# Patient Record
Sex: MALE | Race: WHITE | NOT HISPANIC OR LATINO | Employment: FULL TIME | URBAN - METROPOLITAN AREA
[De-identification: names, ages, dates, MRNs, and addresses within clinical notes are randomized per-mention and may not be internally consistent; named-entity substitution may affect disease eponyms.]

---

## 2023-05-25 DIAGNOSIS — Z13.21 ENCOUNTER FOR VITAMIN DEFICIENCY SCREENING: ICD-10-CM

## 2023-05-25 DIAGNOSIS — Z13.1 SCREENING FOR DIABETES MELLITUS: ICD-10-CM

## 2023-05-25 DIAGNOSIS — Z00.00 HEALTHCARE MAINTENANCE: Primary | ICD-10-CM

## 2023-05-25 DIAGNOSIS — Z13.29 SCREENING FOR THYROID DISORDER: ICD-10-CM

## 2023-06-01 ENCOUNTER — LAB (OUTPATIENT)
Dept: LAB | Facility: LAB | Age: 48
End: 2023-06-01
Payer: COMMERCIAL

## 2023-06-01 DIAGNOSIS — Z13.21 ENCOUNTER FOR VITAMIN DEFICIENCY SCREENING: ICD-10-CM

## 2023-06-01 DIAGNOSIS — Z00.00 HEALTHCARE MAINTENANCE: ICD-10-CM

## 2023-06-01 DIAGNOSIS — Z13.29 SCREENING FOR THYROID DISORDER: ICD-10-CM

## 2023-06-01 DIAGNOSIS — Z13.1 SCREENING FOR DIABETES MELLITUS: ICD-10-CM

## 2023-06-01 LAB
ALANINE AMINOTRANSFERASE (SGPT) (U/L) IN SER/PLAS: 18 U/L (ref 10–52)
ALBUMIN (G/DL) IN SER/PLAS: 4.3 G/DL (ref 3.4–5)
ALKALINE PHOSPHATASE (U/L) IN SER/PLAS: 34 U/L (ref 33–120)
ANION GAP IN SER/PLAS: 12 MMOL/L (ref 10–20)
ASPARTATE AMINOTRANSFERASE (SGOT) (U/L) IN SER/PLAS: 15 U/L (ref 9–39)
BASOPHILS (10*3/UL) IN BLOOD BY AUTOMATED COUNT: 0.03 X10E9/L (ref 0–0.1)
BASOPHILS/100 LEUKOCYTES IN BLOOD BY AUTOMATED COUNT: 0.6 % (ref 0–2)
BILIRUBIN TOTAL (MG/DL) IN SER/PLAS: 0.8 MG/DL (ref 0–1.2)
CALCIDIOL (25 OH VITAMIN D3) (NG/ML) IN SER/PLAS: 45 NG/ML
CALCIUM (MG/DL) IN SER/PLAS: 9.3 MG/DL (ref 8.6–10.3)
CARBON DIOXIDE, TOTAL (MMOL/L) IN SER/PLAS: 24 MMOL/L (ref 21–32)
CHLORIDE (MMOL/L) IN SER/PLAS: 105 MMOL/L (ref 98–107)
CHOLESTEROL (MG/DL) IN SER/PLAS: 165 MG/DL (ref 0–199)
CHOLESTEROL IN HDL (MG/DL) IN SER/PLAS: 31.9 MG/DL
CHOLESTEROL/HDL RATIO: 5.2
CREATININE (MG/DL) IN SER/PLAS: 1.01 MG/DL (ref 0.5–1.3)
EOSINOPHILS (10*3/UL) IN BLOOD BY AUTOMATED COUNT: 0.06 X10E9/L (ref 0–0.7)
EOSINOPHILS/100 LEUKOCYTES IN BLOOD BY AUTOMATED COUNT: 1.1 % (ref 0–6)
ERYTHROCYTE DISTRIBUTION WIDTH (RATIO) BY AUTOMATED COUNT: 14.6 % (ref 11.5–14.5)
ERYTHROCYTE MEAN CORPUSCULAR HEMOGLOBIN CONCENTRATION (G/DL) BY AUTOMATED: 32.9 G/DL (ref 32–36)
ERYTHROCYTE MEAN CORPUSCULAR VOLUME (FL) BY AUTOMATED COUNT: 86 FL (ref 80–100)
ERYTHROCYTES (10*6/UL) IN BLOOD BY AUTOMATED COUNT: 5.45 X10E12/L (ref 4.5–5.9)
ESTIMATED AVERAGE GLUCOSE FOR HBA1C: 123 MG/DL
GFR MALE: >90 ML/MIN/1.73M2
GLUCOSE (MG/DL) IN SER/PLAS: 91 MG/DL (ref 74–99)
HEMATOCRIT (%) IN BLOOD BY AUTOMATED COUNT: 46.8 % (ref 41–52)
HEMOGLOBIN (G/DL) IN BLOOD: 15.4 G/DL (ref 13.5–17.5)
HEMOGLOBIN A1C/HEMOGLOBIN TOTAL IN BLOOD: 5.9 %
IMMATURE GRANULOCYTES/100 LEUKOCYTES IN BLOOD BY AUTOMATED COUNT: 0.2 % (ref 0–0.9)
LDL: 116 MG/DL (ref 0–99)
LEUKOCYTES (10*3/UL) IN BLOOD BY AUTOMATED COUNT: 5.4 X10E9/L (ref 4.4–11.3)
LYMPHOCYTES (10*3/UL) IN BLOOD BY AUTOMATED COUNT: 1.73 X10E9/L (ref 1.2–4.8)
LYMPHOCYTES/100 LEUKOCYTES IN BLOOD BY AUTOMATED COUNT: 32.1 % (ref 13–44)
MONOCYTES (10*3/UL) IN BLOOD BY AUTOMATED COUNT: 0.55 X10E9/L (ref 0.1–1)
MONOCYTES/100 LEUKOCYTES IN BLOOD BY AUTOMATED COUNT: 10.2 % (ref 2–10)
NEUTROPHILS (10*3/UL) IN BLOOD BY AUTOMATED COUNT: 3.01 X10E9/L (ref 1.2–7.7)
NEUTROPHILS/100 LEUKOCYTES IN BLOOD BY AUTOMATED COUNT: 55.8 % (ref 40–80)
PLATELETS (10*3/UL) IN BLOOD AUTOMATED COUNT: 198 X10E9/L (ref 150–450)
POTASSIUM (MMOL/L) IN SER/PLAS: 4.3 MMOL/L (ref 3.5–5.3)
PROTEIN TOTAL: 6.7 G/DL (ref 6.4–8.2)
SODIUM (MMOL/L) IN SER/PLAS: 137 MMOL/L (ref 136–145)
THYROTROPIN (MIU/L) IN SER/PLAS BY DETECTION LIMIT <= 0.05 MIU/L: 1.74 MIU/L (ref 0.44–3.98)
TRIGLYCERIDE (MG/DL) IN SER/PLAS: 85 MG/DL (ref 0–149)
UREA NITROGEN (MG/DL) IN SER/PLAS: 15 MG/DL (ref 6–23)
VLDL: 17 MG/DL (ref 0–40)

## 2023-06-01 PROCEDURE — 85025 COMPLETE CBC W/AUTO DIFF WBC: CPT

## 2023-06-01 PROCEDURE — 36415 COLL VENOUS BLD VENIPUNCTURE: CPT

## 2023-06-01 PROCEDURE — 80061 LIPID PANEL: CPT

## 2023-06-01 PROCEDURE — 82306 VITAMIN D 25 HYDROXY: CPT

## 2023-06-01 PROCEDURE — 84443 ASSAY THYROID STIM HORMONE: CPT

## 2023-06-01 PROCEDURE — 83036 HEMOGLOBIN GLYCOSYLATED A1C: CPT

## 2023-06-01 PROCEDURE — 80053 COMPREHEN METABOLIC PANEL: CPT

## 2023-06-02 ENCOUNTER — TELEPHONE (OUTPATIENT)
Dept: PRIMARY CARE | Facility: CLINIC | Age: 48
End: 2023-06-02
Payer: COMMERCIAL

## 2023-06-02 NOTE — TELEPHONE ENCOUNTER
----- Message from KANDI Escudero sent at 6/2/2023  4:14 AM EDT -----  Pt was to have appointment with me for CPX  please call to schedule.

## 2023-06-08 ENCOUNTER — OFFICE VISIT (OUTPATIENT)
Dept: PRIMARY CARE | Facility: CLINIC | Age: 48
End: 2023-06-08
Payer: COMMERCIAL

## 2023-06-08 VITALS
HEIGHT: 71 IN | BODY MASS INDEX: 28 KG/M2 | WEIGHT: 200 LBS | SYSTOLIC BLOOD PRESSURE: 140 MMHG | TEMPERATURE: 98 F | DIASTOLIC BLOOD PRESSURE: 80 MMHG

## 2023-06-08 DIAGNOSIS — I25.10 CORONARY ARTERY DISEASE INVOLVING NATIVE HEART WITHOUT ANGINA PECTORIS, UNSPECIFIED VESSEL OR LESION TYPE: ICD-10-CM

## 2023-06-08 DIAGNOSIS — Z23 NEED FOR DIPHTHERIA-TETANUS-PERTUSSIS (TDAP) VACCINE: ICD-10-CM

## 2023-06-08 DIAGNOSIS — E78.6 LOW HDL (UNDER 40): ICD-10-CM

## 2023-06-08 DIAGNOSIS — I83.11 VARICOSE VEINS OF BOTH LOWER EXTREMITIES WITH INFLAMMATION: ICD-10-CM

## 2023-06-08 DIAGNOSIS — I83.11 VARICOSE VEINS OF RIGHT LOWER EXTREMITY WITH INFLAMMATION: ICD-10-CM

## 2023-06-08 DIAGNOSIS — I83.12 VARICOSE VEINS OF BOTH LOWER EXTREMITIES WITH INFLAMMATION: ICD-10-CM

## 2023-06-08 DIAGNOSIS — L73.9 FOLLICULITIS: ICD-10-CM

## 2023-06-08 DIAGNOSIS — E78.01 FAMILIAL HYPERCHOLESTEROLEMIA: ICD-10-CM

## 2023-06-08 DIAGNOSIS — Z00.00 ROUTINE GENERAL MEDICAL EXAMINATION AT A HEALTH CARE FACILITY: Primary | ICD-10-CM

## 2023-06-08 PROCEDURE — 1036F TOBACCO NON-USER: CPT | Performed by: NURSE PRACTITIONER

## 2023-06-08 PROCEDURE — 90715 TDAP VACCINE 7 YRS/> IM: CPT | Performed by: NURSE PRACTITIONER

## 2023-06-08 PROCEDURE — 90471 IMMUNIZATION ADMIN: CPT | Performed by: NURSE PRACTITIONER

## 2023-06-08 PROCEDURE — 99396 PREV VISIT EST AGE 40-64: CPT | Performed by: NURSE PRACTITIONER

## 2023-06-08 RX ORDER — FLUTICASONE PROPIONATE 50 MCG
SPRAY, SUSPENSION (ML) NASAL DAILY
COMMUNITY
Start: 2020-01-02

## 2023-06-08 RX ORDER — MAGNESIUM 250 MG
1 TABLET ORAL DAILY
COMMUNITY

## 2023-06-08 RX ORDER — MULTIVITAMIN
TABLET ORAL DAILY
COMMUNITY
Start: 2007-11-01

## 2023-06-08 RX ORDER — CHOLECALCIFEROL (VITAMIN D3) 125 MCG
1 CAPSULE ORAL DAILY
COMMUNITY
Start: 2017-05-19

## 2023-06-08 RX ORDER — DOXYCYCLINE 100 MG/1
100 CAPSULE ORAL 2 TIMES DAILY
Qty: 14 CAPSULE | Refills: 0 | Status: SHIPPED | OUTPATIENT
Start: 2023-06-08 | End: 2023-06-15

## 2023-06-08 RX ORDER — ACETAMINOPHEN 500 MG
1 TABLET ORAL NIGHTLY
COMMUNITY
Start: 2017-05-19 | End: 2023-12-08 | Stop reason: WASHOUT

## 2023-06-08 RX ORDER — ROSUVASTATIN CALCIUM 10 MG/1
10 TABLET, COATED ORAL DAILY
COMMUNITY
End: 2023-06-11 | Stop reason: SDUPTHER

## 2023-06-08 ASSESSMENT — PATIENT HEALTH QUESTIONNAIRE - PHQ9
2. FEELING DOWN, DEPRESSED OR HOPELESS: NOT AT ALL
1. LITTLE INTEREST OR PLEASURE IN DOING THINGS: NOT AT ALL
SUM OF ALL RESPONSES TO PHQ9 QUESTIONS 1 AND 2: 0

## 2023-06-08 NOTE — PATIENT INSTRUCTIONS
Good to see you today.  For the vein, call Vascular:  [phone]  For the CT Cardiac Score, Call [phone]  Start Doxycycline one pill twice daily TAKE AFTER EATING  Use Bacitracin twice daily.  Let me know if this is not improving.  Tetanus and pertussis - good for 10 years.  Plan to follow up with fasting labs in 6 months then follow up to review.

## 2023-06-08 NOTE — PROGRESS NOTES
"Subjective   Patient ID: Rodolfo Madera is a 48 y.o. male who presents for Annual Exam.    HPI   Left Repros in April and now working at   \"[employer]\"    Still working out regularly.    75 Hard work out every day and following strict plan and no sugar.    Put on some muscle pounds 5 #   Started with knee pain.    Running again.  Would like to get back to 183 #    Going to [place] with family in a couple weeks  Has had a cyst on scrotum.  No blistering or discharge  Saw Dr. Hernandez for issue but did not resolve.     Review of Systems   All other systems reviewed and are negative.      Objective   /80   Temp 36.7 °C (98 °F)   Ht 1.791 m (5' 10.5\")   Wt 90.7 kg (200 lb)   BMI 28.29 kg/m²     Physical Exam  Vitals and nursing note reviewed.   Constitutional:       Appearance: Normal appearance.   HENT:      Head: Normocephalic and atraumatic.      Right Ear: Tympanic membrane, ear canal and external ear normal.      Left Ear: Tympanic membrane, ear canal and external ear normal.      Nose: Nose normal.      Mouth/Throat:      Pharynx: Oropharynx is clear.   Eyes:      Pupils: Pupils are equal, round, and reactive to light.   Cardiovascular:      Rate and Rhythm: Normal rate and regular rhythm.      Pulses: Normal pulses.      Heart sounds: Normal heart sounds.   Pulmonary:      Breath sounds: Normal breath sounds.   Abdominal:      General: Bowel sounds are normal.      Palpations: Abdomen is soft.      Hernia: No hernia is present.   Genitourinary:     Testes: Normal.      Comments: Firm pustule at shaft of penis  Musculoskeletal:         General: Normal range of motion.      Cervical back: Normal range of motion and neck supple.   Skin:     General: Skin is warm.      Capillary Refill: Capillary refill takes 2 to 3 seconds.   Neurological:      Mental Status: He is alert and oriented to person, place, and time. Mental status is at baseline.   Psychiatric:         Mood and Affect: Mood normal.        "  Behavior: Behavior normal.         Thought Content: Thought content normal.         Judgment: Judgment normal.         Assessment/Plan   Problem List Items Addressed This Visit       Coronary artery disease involving native heart without angina pectoris    Relevant Medications    rosuvastatin (Crestor) 10 mg tablet    Other Relevant Orders    CT cardiac scoring wo IV contrast    Folliculitis    Hyperlipidemia    Low HDL (under 40)    Need for diphtheria-tetanus-pertussis (Tdap) vaccine    Relevant Orders    Tdap vaccine, age 10 years and older (BOOSTRIX) (Completed)    Routine general medical examination at a health care facility - Primary    Varicose veins of right lower extremity with inflammation     >>ASSESSMENT AND PLAN FOR VARICOSE VEINS OF RIGHT LOWER EXTREMITY WITH INFLAMMATION WRITTEN ON 7/5/2023  7:10 AM BY KANDI PEDERSON    Refer to Vascular    >>ASSESSMENT AND PLAN FOR VARICOSE VEINS OF LEGS WRITTEN ON 7/5/2023  7:08 AM BY KANDI PEDERSON    Right lower extremity with prominent superficial vasculature         Relevant Orders    Referral to Vascular Medicine     Other Visit Diagnoses       Varicose veins of both lower extremities with inflammation

## 2023-06-11 RX ORDER — ROSUVASTATIN CALCIUM 10 MG/1
10 TABLET, COATED ORAL DAILY
Qty: 90 TABLET | Refills: 3 | Status: SHIPPED | OUTPATIENT
Start: 2023-06-11 | End: 2023-06-12 | Stop reason: SDUPTHER

## 2023-06-11 RX ORDER — ROSUVASTATIN CALCIUM 10 MG/1
10 TABLET, COATED ORAL DAILY
Qty: 30 TABLET | Refills: 0 | Status: SHIPPED | OUTPATIENT
Start: 2023-06-11 | End: 2023-06-11 | Stop reason: SDUPTHER

## 2023-06-12 ENCOUNTER — TELEPHONE (OUTPATIENT)
Dept: PRIMARY CARE | Facility: CLINIC | Age: 48
End: 2023-06-12
Payer: COMMERCIAL

## 2023-06-12 RX ORDER — ROSUVASTATIN CALCIUM 10 MG/1
10 TABLET, COATED ORAL DAILY
Qty: 30 TABLET | Refills: 0 | Status: SHIPPED | OUTPATIENT
Start: 2023-06-12 | End: 2023-12-08 | Stop reason: SDUPTHER

## 2023-06-12 NOTE — TELEPHONE ENCOUNTER
----- Message from Mason Madera sent at 6/12/2023  2:30 PM EDT -----  Regarding: Test results   Contact: [phone]  Maybe I’m misunderstanding and the script is being sent to me in mail. Radha

## 2023-07-05 PROBLEM — E78.6 LOW HDL (UNDER 40): Status: ACTIVE | Noted: 2023-07-05

## 2023-07-05 PROBLEM — L72.0 EPIDERMAL CYST: Status: ACTIVE | Noted: 2023-07-05

## 2023-07-05 PROBLEM — L73.9 FOLLICULITIS: Status: ACTIVE | Noted: 2023-07-05

## 2023-07-05 PROBLEM — R73.9 ELEVATED BLOOD SUGAR: Status: ACTIVE | Noted: 2023-07-05

## 2023-07-05 PROBLEM — R91.1 LUNG NODULE: Status: ACTIVE | Noted: 2023-07-05

## 2023-07-05 PROBLEM — I83.93 VARICOSE VEINS OF LEGS: Status: ACTIVE | Noted: 2023-07-05

## 2023-07-05 PROBLEM — D72.9 ABNORMAL WBC COUNT: Status: ACTIVE | Noted: 2023-07-05

## 2023-07-05 PROBLEM — U09.9 POST-COVID CHRONIC FATIGUE: Status: ACTIVE | Noted: 2023-07-05

## 2023-07-05 PROBLEM — N48.9 PENILE LESION: Status: ACTIVE | Noted: 2023-07-05

## 2023-07-05 PROBLEM — I25.10 CORONARY ARTERY DISEASE INVOLVING NATIVE HEART WITHOUT ANGINA PECTORIS: Status: ACTIVE | Noted: 2023-07-05

## 2023-07-05 PROBLEM — Z23 NEED FOR DIPHTHERIA-TETANUS-PERTUSSIS (TDAP) VACCINE: Status: ACTIVE | Noted: 2023-07-05

## 2023-07-05 PROBLEM — K21.9 GERD (GASTROESOPHAGEAL REFLUX DISEASE): Status: ACTIVE | Noted: 2023-07-05

## 2023-07-05 PROBLEM — E78.5 HYPERLIPIDEMIA: Status: ACTIVE | Noted: 2023-07-05

## 2023-07-05 PROBLEM — R06.09 DYSPNEA ON EXERTION: Status: ACTIVE | Noted: 2023-07-05

## 2023-07-05 PROBLEM — E55.9 VITAMIN D DEFICIENCY: Status: ACTIVE | Noted: 2023-07-05

## 2023-07-05 PROBLEM — G93.32 POST-COVID CHRONIC FATIGUE: Status: ACTIVE | Noted: 2023-07-05

## 2023-07-05 PROBLEM — I83.11 VARICOSE VEINS OF RIGHT LOWER EXTREMITY WITH INFLAMMATION: Status: ACTIVE | Noted: 2023-07-05

## 2023-07-05 PROBLEM — Z00.00 ROUTINE GENERAL MEDICAL EXAMINATION AT A HEALTH CARE FACILITY: Status: ACTIVE | Noted: 2023-07-05

## 2023-07-05 NOTE — ASSESSMENT & PLAN NOTE
>>ASSESSMENT AND PLAN FOR VARICOSE VEINS OF RIGHT LOWER EXTREMITY WITH INFLAMMATION WRITTEN ON 7/5/2023  7:10 AM BY KANDI PEDERSON    Refer to Vascular    >>ASSESSMENT AND PLAN FOR VARICOSE VEINS OF LEGS WRITTEN ON 7/5/2023  7:08 AM BY KANDI PEDERSON    Right lower extremity with prominent superficial vasculature

## 2023-10-17 ENCOUNTER — ANESTHESIA EVENT (OUTPATIENT)
Dept: OPERATING ROOM | Facility: CLINIC | Age: 48
End: 2023-10-17
Payer: COMMERCIAL

## 2023-10-18 ENCOUNTER — HOSPITAL ENCOUNTER (OUTPATIENT)
Facility: CLINIC | Age: 48
Setting detail: OUTPATIENT SURGERY
Discharge: HOME | End: 2023-10-18
Attending: SURGERY | Admitting: SURGERY
Payer: COMMERCIAL

## 2023-10-18 ENCOUNTER — ANESTHESIA (OUTPATIENT)
Dept: OPERATING ROOM | Facility: CLINIC | Age: 48
End: 2023-10-18
Payer: COMMERCIAL

## 2023-10-18 VITALS
HEART RATE: 70 BPM | BODY MASS INDEX: 28.64 KG/M2 | HEIGHT: 71 IN | TEMPERATURE: 97.2 F | DIASTOLIC BLOOD PRESSURE: 57 MMHG | RESPIRATION RATE: 16 BRPM | SYSTOLIC BLOOD PRESSURE: 116 MMHG | OXYGEN SATURATION: 97 % | WEIGHT: 204.59 LBS

## 2023-10-18 DIAGNOSIS — I83.93 ASYMPTOMATIC VARICOSE VEINS OF BILATERAL LOWER EXTREMITIES: ICD-10-CM

## 2023-10-18 DIAGNOSIS — I87.2 VENOUS (PERIPHERAL) INSUFFICIENCY: ICD-10-CM

## 2023-10-18 DIAGNOSIS — I83.11 VARICOSE VEINS OF RIGHT LOWER EXTREMITY WITH INFLAMMATION: Primary | ICD-10-CM

## 2023-10-18 DIAGNOSIS — I83.819 VARICOSE VEINS OF UNSPECIFIED LOWER EXTREMITY WITH PAIN: ICD-10-CM

## 2023-10-18 PROCEDURE — 2500000002 HC RX 250 W HCPCS SELF ADMINISTERED DRUGS (ALT 637 FOR MEDICARE OP, ALT 636 FOR OP/ED): Performed by: ANESTHESIOLOGY

## 2023-10-18 PROCEDURE — 88300 SURGICAL PATH GROSS: CPT | Mod: TC,SUR,CMCLAB | Performed by: SURGERY

## 2023-10-18 PROCEDURE — 7100000001 HC RECOVERY ROOM TIME - INITIAL BASE CHARGE: Performed by: SURGERY

## 2023-10-18 PROCEDURE — 7100000010 HC PHASE TWO TIME - EACH INCREMENTAL 1 MINUTE: Performed by: SURGERY

## 2023-10-18 PROCEDURE — 3700000001 HC GENERAL ANESTHESIA TIME - INITIAL BASE CHARGE: Performed by: SURGERY

## 2023-10-18 PROCEDURE — 88300 SURGICAL PATH GROSS: CPT | Performed by: PATHOLOGY

## 2023-10-18 PROCEDURE — 36475 ENDOVENOUS RF 1ST VEIN: CPT | Performed by: SURGERY

## 2023-10-18 PROCEDURE — 2500000004 HC RX 250 GENERAL PHARMACY W/ HCPCS (ALT 636 FOR OP/ED): Performed by: NURSE ANESTHETIST, CERTIFIED REGISTERED

## 2023-10-18 PROCEDURE — C1769 GUIDE WIRE: HCPCS | Performed by: SURGERY

## 2023-10-18 PROCEDURE — C1888 ENDOVAS NON-CARDIAC ABL CATH: HCPCS | Performed by: SURGERY

## 2023-10-18 PROCEDURE — C1894 INTRO/SHEATH, NON-LASER: HCPCS | Performed by: SURGERY

## 2023-10-18 PROCEDURE — 2500000005 HC RX 250 GENERAL PHARMACY W/O HCPCS: Performed by: SURGERY

## 2023-10-18 PROCEDURE — 2720000007 HC OR 272 NO HCPCS: Performed by: SURGERY

## 2023-10-18 PROCEDURE — 3700000002 HC GENERAL ANESTHESIA TIME - EACH INCREMENTAL 1 MINUTE: Performed by: SURGERY

## 2023-10-18 PROCEDURE — 37765 STAB PHLEB VEINS XTR 10-20: CPT | Performed by: SURGERY

## 2023-10-18 PROCEDURE — A36012 PR PLACE CATH IN VEIN,SUBSELECT: Performed by: ANESTHESIOLOGY

## 2023-10-18 PROCEDURE — 3600000005 HC OR TIME - INITIAL BASE CHARGE - PROCEDURE LEVEL FIVE: Performed by: SURGERY

## 2023-10-18 PROCEDURE — 2580000001 HC RX 258 IV SOLUTIONS: Performed by: ANESTHESIOLOGY

## 2023-10-18 PROCEDURE — 7100000002 HC RECOVERY ROOM TIME - EACH INCREMENTAL 1 MINUTE: Performed by: SURGERY

## 2023-10-18 PROCEDURE — 2500000004 HC RX 250 GENERAL PHARMACY W/ HCPCS (ALT 636 FOR OP/ED): Performed by: SURGERY

## 2023-10-18 PROCEDURE — 7100000009 HC PHASE TWO TIME - INITIAL BASE CHARGE: Performed by: SURGERY

## 2023-10-18 PROCEDURE — A36012 PR PLACE CATH IN VEIN,SUBSELECT: Performed by: NURSE ANESTHETIST, CERTIFIED REGISTERED

## 2023-10-18 PROCEDURE — A4217 STERILE WATER/SALINE, 500 ML: HCPCS | Performed by: SURGERY

## 2023-10-18 PROCEDURE — 2500000005 HC RX 250 GENERAL PHARMACY W/O HCPCS: Performed by: NURSE ANESTHETIST, CERTIFIED REGISTERED

## 2023-10-18 PROCEDURE — 3600000010 HC OR TIME - EACH INCREMENTAL 1 MINUTE - PROCEDURE LEVEL FIVE: Performed by: SURGERY

## 2023-10-18 PROCEDURE — 2500000001 HC RX 250 WO HCPCS SELF ADMINISTERED DRUGS (ALT 637 FOR MEDICARE OP): Performed by: ANESTHESIOLOGY

## 2023-10-18 RX ORDER — GLYCOPYRROLATE 0.2 MG/ML
INJECTION INTRAMUSCULAR; INTRAVENOUS AS NEEDED
Status: DISCONTINUED | OUTPATIENT
Start: 2023-10-18 | End: 2023-10-18

## 2023-10-18 RX ORDER — PROPOFOL 10 MG/ML
INJECTION, EMULSION INTRAVENOUS AS NEEDED
Status: DISCONTINUED | OUTPATIENT
Start: 2023-10-18 | End: 2023-10-18

## 2023-10-18 RX ORDER — SODIUM CHLORIDE, SODIUM LACTATE, POTASSIUM CHLORIDE, CALCIUM CHLORIDE 600; 310; 30; 20 MG/100ML; MG/100ML; MG/100ML; MG/100ML
100 INJECTION, SOLUTION INTRAVENOUS CONTINUOUS
Status: DISCONTINUED | OUTPATIENT
Start: 2023-10-18 | End: 2023-10-18 | Stop reason: HOSPADM

## 2023-10-18 RX ORDER — OXYCODONE HYDROCHLORIDE 5 MG/1
5 TABLET ORAL EVERY 4 HOURS PRN
Status: DISCONTINUED | OUTPATIENT
Start: 2023-10-18 | End: 2023-10-18 | Stop reason: HOSPADM

## 2023-10-18 RX ORDER — LABETALOL HYDROCHLORIDE 5 MG/ML
5 INJECTION, SOLUTION INTRAVENOUS ONCE AS NEEDED
Status: DISCONTINUED | OUTPATIENT
Start: 2023-10-18 | End: 2023-10-18 | Stop reason: HOSPADM

## 2023-10-18 RX ORDER — CEFAZOLIN SODIUM 1 G/50ML
SOLUTION INTRAVENOUS AS NEEDED
Status: DISCONTINUED | OUTPATIENT
Start: 2023-10-18 | End: 2023-10-18

## 2023-10-18 RX ORDER — HYDROMORPHONE HYDROCHLORIDE 1 MG/ML
0.5 INJECTION, SOLUTION INTRAMUSCULAR; INTRAVENOUS; SUBCUTANEOUS EVERY 5 MIN PRN
Status: DISCONTINUED | OUTPATIENT
Start: 2023-10-18 | End: 2023-10-18 | Stop reason: HOSPADM

## 2023-10-18 RX ORDER — DIPHENHYDRAMINE HYDROCHLORIDE 50 MG/ML
12.5 INJECTION INTRAMUSCULAR; INTRAVENOUS ONCE AS NEEDED
Status: DISCONTINUED | OUTPATIENT
Start: 2023-10-18 | End: 2023-10-18 | Stop reason: HOSPADM

## 2023-10-18 RX ORDER — FENTANYL CITRATE 50 UG/ML
INJECTION, SOLUTION INTRAMUSCULAR; INTRAVENOUS AS NEEDED
Status: DISCONTINUED | OUTPATIENT
Start: 2023-10-18 | End: 2023-10-18

## 2023-10-18 RX ORDER — TRAMADOL HYDROCHLORIDE 50 MG/1
50 TABLET ORAL EVERY 6 HOURS PRN
Qty: 15 TABLET | Refills: 0 | Status: SHIPPED | OUTPATIENT
Start: 2023-10-18 | End: 2023-10-18 | Stop reason: SDUPTHER

## 2023-10-18 RX ORDER — DEXAMETHASONE SODIUM PHOSPHATE 100 MG/10ML
INJECTION INTRAMUSCULAR; INTRAVENOUS AS NEEDED
Status: DISCONTINUED | OUTPATIENT
Start: 2023-10-18 | End: 2023-10-18

## 2023-10-18 RX ORDER — PHENYLEPHRINE HCL IN 0.9% NACL 1 MG/10 ML
SYRINGE (ML) INTRAVENOUS AS NEEDED
Status: DISCONTINUED | OUTPATIENT
Start: 2023-10-18 | End: 2023-10-18

## 2023-10-18 RX ORDER — LIDOCAINE HYDROCHLORIDE 10 MG/ML
0.1 INJECTION, SOLUTION EPIDURAL; INFILTRATION; INTRACAUDAL; PERINEURAL ONCE
Status: DISCONTINUED | OUTPATIENT
Start: 2023-10-18 | End: 2023-10-18 | Stop reason: HOSPADM

## 2023-10-18 RX ORDER — TRAMADOL HYDROCHLORIDE 50 MG/1
50 TABLET ORAL EVERY 6 HOURS PRN
Qty: 15 TABLET | Refills: 0 | Status: SHIPPED | OUTPATIENT
Start: 2023-10-18 | End: 2023-11-22 | Stop reason: ALTCHOICE

## 2023-10-18 RX ORDER — LIDOCAINE HYDROCHLORIDE 10 MG/ML
INJECTION INFILTRATION; PERINEURAL AS NEEDED
Status: DISCONTINUED | OUTPATIENT
Start: 2023-10-18 | End: 2023-10-18

## 2023-10-18 RX ORDER — TRAMADOL HYDROCHLORIDE 50 MG/1
50 TABLET ORAL EVERY 6 HOURS PRN
Qty: 15 TABLET | Refills: 0 | Status: CANCELLED | OUTPATIENT
Start: 2023-10-18

## 2023-10-18 RX ORDER — APREPITANT 40 MG/1
40 CAPSULE ORAL DAILY
Status: DISCONTINUED | OUTPATIENT
Start: 2023-10-18 | End: 2023-10-18 | Stop reason: HOSPADM

## 2023-10-18 RX ORDER — PROPOFOL 10 MG/ML
INJECTION, EMULSION INTRAVENOUS CONTINUOUS PRN
Status: DISCONTINUED | OUTPATIENT
Start: 2023-10-18 | End: 2023-10-18

## 2023-10-18 RX ORDER — MIDAZOLAM HYDROCHLORIDE 1 MG/ML
INJECTION, SOLUTION INTRAMUSCULAR; INTRAVENOUS AS NEEDED
Status: DISCONTINUED | OUTPATIENT
Start: 2023-10-18 | End: 2023-10-18

## 2023-10-18 RX ORDER — ONDANSETRON HYDROCHLORIDE 2 MG/ML
4 INJECTION, SOLUTION INTRAVENOUS ONCE AS NEEDED
Status: DISCONTINUED | OUTPATIENT
Start: 2023-10-18 | End: 2023-10-18 | Stop reason: HOSPADM

## 2023-10-18 RX ORDER — ONDANSETRON HYDROCHLORIDE 2 MG/ML
INJECTION, SOLUTION INTRAVENOUS AS NEEDED
Status: DISCONTINUED | OUTPATIENT
Start: 2023-10-18 | End: 2023-10-18

## 2023-10-18 RX ORDER — ACETAMINOPHEN 325 MG/1
650 TABLET ORAL EVERY 4 HOURS PRN
Status: DISCONTINUED | OUTPATIENT
Start: 2023-10-18 | End: 2023-10-18 | Stop reason: HOSPADM

## 2023-10-18 RX ORDER — ALBUTEROL SULFATE 0.83 MG/ML
2.5 SOLUTION RESPIRATORY (INHALATION) ONCE AS NEEDED
Status: DISCONTINUED | OUTPATIENT
Start: 2023-10-18 | End: 2023-10-18 | Stop reason: HOSPADM

## 2023-10-18 RX ADMIN — ONDANSETRON 4 MG: 2 INJECTION, SOLUTION INTRAMUSCULAR; INTRAVENOUS at 10:12

## 2023-10-18 RX ADMIN — LIDOCAINE HYDROCHLORIDE 80 ML: 10 INJECTION, SOLUTION INFILTRATION; PERINEURAL at 07:54

## 2023-10-18 RX ADMIN — SODIUM CHLORIDE, POTASSIUM CHLORIDE, SODIUM LACTATE AND CALCIUM CHLORIDE: 600; 310; 30; 20 INJECTION, SOLUTION INTRAVENOUS at 07:52

## 2023-10-18 RX ADMIN — CEFAZOLIN SODIUM 2 G: 1 INJECTION, SOLUTION INTRAVENOUS at 08:04

## 2023-10-18 RX ADMIN — MIDAZOLAM 2 MG: 1 INJECTION INTRAMUSCULAR; INTRAVENOUS at 07:50

## 2023-10-18 RX ADMIN — GLYCOPYRROLATE 0.2 MG: 0.2 INJECTION INTRAMUSCULAR; INTRAVENOUS at 07:52

## 2023-10-18 RX ADMIN — FENTANYL CITRATE 50 MCG: 50 INJECTION, SOLUTION INTRAMUSCULAR; INTRAVENOUS at 10:04

## 2023-10-18 RX ADMIN — PROPOFOL 50 MCG/KG/MIN: 10 INJECTION, EMULSION INTRAVENOUS at 08:04

## 2023-10-18 RX ADMIN — FENTANYL CITRATE 50 MCG: 50 INJECTION, SOLUTION INTRAMUSCULAR; INTRAVENOUS at 07:54

## 2023-10-18 RX ADMIN — PROPOFOL 200 MG: 10 INJECTION, EMULSION INTRAVENOUS at 07:54

## 2023-10-18 RX ADMIN — FENTANYL CITRATE 50 MCG: 50 INJECTION, SOLUTION INTRAMUSCULAR; INTRAVENOUS at 08:04

## 2023-10-18 RX ADMIN — DEXAMETHASONE SODIUM PHOSPHATE 10 MG: 10 INJECTION INTRAMUSCULAR; INTRAVENOUS at 08:06

## 2023-10-18 RX ADMIN — SODIUM CHLORIDE, POTASSIUM CHLORIDE, SODIUM LACTATE AND CALCIUM CHLORIDE 100 ML/HR: 600; 310; 30; 20 INJECTION, SOLUTION INTRAVENOUS at 07:15

## 2023-10-18 RX ADMIN — Medication 80 MCG: at 08:56

## 2023-10-18 RX ADMIN — Medication 80 MCG: at 08:37

## 2023-10-18 RX ADMIN — Medication 80 MCG: at 08:44

## 2023-10-18 RX ADMIN — OXYCODONE 5 MG: 5 TABLET ORAL at 11:19

## 2023-10-18 RX ADMIN — APREPITANT 40 MG: 40 CAPSULE ORAL at 07:17

## 2023-10-18 ASSESSMENT — COLUMBIA-SUICIDE SEVERITY RATING SCALE - C-SSRS
2. HAVE YOU ACTUALLY HAD ANY THOUGHTS OF KILLING YOURSELF?: NO
1. IN THE PAST MONTH, HAVE YOU WISHED YOU WERE DEAD OR WISHED YOU COULD GO TO SLEEP AND NOT WAKE UP?: NO

## 2023-10-18 ASSESSMENT — PAIN SCALES - GENERAL
PAINLEVEL_OUTOF10: 0 - NO PAIN
PAINLEVEL_OUTOF10: 5 - MODERATE PAIN
PAINLEVEL_OUTOF10: 6
PAINLEVEL_OUTOF10: 0 - NO PAIN
PAINLEVEL_OUTOF10: 5 - MODERATE PAIN

## 2023-10-18 ASSESSMENT — PAIN - FUNCTIONAL ASSESSMENT
PAIN_FUNCTIONAL_ASSESSMENT: 0-10

## 2023-10-18 ASSESSMENT — PAIN DESCRIPTION - DESCRIPTORS: DESCRIPTORS: BURNING

## 2023-10-18 NOTE — DISCHARGE INSTRUCTIONS
Today you underwent: ____RIGHT LEG GSV RFA AND PHLEBECTOMY_________________________________________  Your follow ultrasound is on: _______FRIDAY________________at location ________TBD_____________  Your Vein Center follow up is on: ____1 MONTH___________with:  _____DR. DIOR OR GLEN SUTTON NP_________    No Driving for 24 hours      No Driving Restrictions    A responsible adult over 18 years of age should provide supervision for 12-24 hours    Dressing: An ace wrap has been placed on your leg. If it rolls down, reapply. If you are having trouble with the wrap, please apply your compression stockings. Under this wrap is white rolled gauze with gauze padding. Under the padding are Steri-Strips or skin glue. Do not remove these  as they usually fall off on their own. Keep the dressing on, all the time, for 48 hours or until you present to your ultrasound appointment. After 48 hours, remove, shower (see below), and apply your compression stockings.     Wear knee / thigh high compression stockings 24-hours a day for the first week after wraps are removed.    Bathing: Sponge bath during first 48 hours. You can shower after you remove the ace wrap with mild soap and water. Pat dry. Do not scrub the incision sites or remove the Steri-Strips/skin glue. No swimming, tub soaking, or hot tub use for 2 weeks.     Diet: Resume previous diet.      No alcohol for 24 hours if sedation was used.    Activity: Resume your normal activities. Avoid strenuous exercise (like weightlifting and squatting) for a few days after the procedure. Please walk a minimum of 1 hour a day, which may be divided up into 10-20 minute intervals. We encourage you to walk as much as possible. Walking will reduce your risk of blood clots. Keep legs elevated whenever possible. Avoid standing still or sitting with legs down whenever possible.     Travel: Do not ride in a car for more than 3 hours, or take any air travel for 30 days after your  surgery.    Medications: Resume all medications     What to expect:  Minimal staining of your dressing for the first 48 hours.  Mild to moderate discomfort. This can be treated with leg elevation and/or acetaminophen (Tylenol) or ibuprofen (Advil/Motrin). You may also use an ice pack directly over the wound to reduce swelling, bruising, and pain over the first few days.  Bruising is common and may take a couple of weeks to resolve.  Hard lumpy areas.    If you experience any of the follow problems, please call [phone].  Excessive redness or warmth of the leg or swelling of the leg.   Drainage from incision site or an expanding bulge/hardness near those sites.   Temperature greater than 101, with chills  Shortness of breath, chest pain, rapid heart rate - Please seek immediate medical attention    In the event of an emergency, please call [phone].

## 2023-10-18 NOTE — ANESTHESIA PREPROCEDURE EVALUATION
Patient: Rodolfo Madera    Procedure Information       Date/Time: 10/18/23 0730    Procedure: RIGHT GSV RFA, 10 PHLEBECTOMY (Right)    [place] [facility] [place] [facility] [place] [facility] [place] [facility] [place]    Surgeons: Silvana Beard MD            Relevant Problems   Cardiovascular   (+) Coronary artery disease involving native heart without angina pectoris   (+) Hyperlipidemia      GI   (+) GERD (gastroesophageal reflux disease)      Pulmonary   (+) Dyspnea on exertion       Clinical information reviewed:   Tobacco  Allergies  Meds   Med Hx  Surg Hx   Fam Hx  Soc Hx        NPO Detail:  NPO/Void Status  Date of Last Liquid: 10/18/23  Time of Last Liquid: 0520  Date of Last Solid: 10/17/23  Time of Last Solid: 1900  Last Intake Type: Clear fluids         Physical Exam    Airway  Mallampati: I  TM distance: >3 FB  Neck ROM: full     Cardiovascular    Dental - normal exam     Pulmonary    Abdominal          Anesthesia Plan    ASA 1     general     intravenous induction   Trial extubation is planned.  Anesthetic plan and risks discussed with patient.

## 2023-10-18 NOTE — H&P
"History Of Present Illness  Rodolfo Madera is a 48 y.o. male presenting with symptomatic right lower extremity varicose veins and swelling.  He is here for right leg GSV RFA ablation with stab phlebectomy.  No interval changes since his last clinic note.     Past Medical History  Past Medical History:   Diagnosis Date    Other specified disorders of temporomandibular joint 05/19/2017    TMJ derangement    PONV (postoperative nausea and vomiting)        Surgical History  Past Surgical History:   Procedure Laterality Date    HERNIA REPAIR  08/05/2016    Hernia Repair    LYMPH NODE BIOPSY  08/05/2016    Biopsy Lymph Node    OTHER SURGICAL HISTORY  08/05/2016    Surgery Testis Reduction Of Torsion Of Testis Left    OTHER SURGICAL HISTORY  05/07/2021    Dental surgery    OTHER SURGICAL HISTORY  05/19/2017    Arthrodesis Tarsometatarsal 4th Metatarsal-Cuboid Right    SHOULDER SURGERY  05/07/2021    Shoulder Surgery    VASECTOMY  08/05/2016    Surgery Vas Deferens Vasectomy        Social History  He reports that he has quit smoking. His smoking use included cigarettes. He has quit using smokeless tobacco. No history on file for alcohol use and drug use.    Family History  Family History   Problem Relation Name Age of Onset    Hypertension Mother      Breast cancer Mother      Other (CARDIAC ARRHYTHMIA) Father      Other (MYELOFIBROSIS) Maternal Grandfather      Lymphoma Paternal Grandfather          Allergies  Sulfa (sulfonamide antibiotics)    Review of Systems     Physical Exam     Last Recorded Vitals  Blood pressure 119/71, pulse 75, temperature 36.4 °C (97.5 °F), temperature source Tympanic, resp. rate 16, height 1.803 m (5' 11\"), weight 92.8 kg (204 lb 9.4 oz), SpO2 98 %.    Relevant Results           Assessment/Plan   Active Problems:  There are no active Hospital Problems.      Symptomatic right lower extremity varicose veins with venous insufficiency, pain, edema.  Here for venous ambulatory intervention.   "       Silvana Beard MD

## 2023-10-18 NOTE — ANESTHESIA POSTPROCEDURE EVALUATION
Patient: Rodolfo Madera    Procedure Summary       Date: 10/18/23 [facility] [place] [facility] [place] [facility]    Anesthesia Start: 0727 Anesthesia Stop: 1024    Procedure: RIGHT GSV RFA, 10 PHLEBECTOMY (Right) Diagnosis:       Venous (peripheral) insufficiency      Varicose veins of unspecified lower extremity with pain      Asymptomatic varicose veins of bilateral lower extremities      (Venous (peripheral) insufficiency [I87.2])      (Varicose veins of unspecified lower extremity with pain [I83.819])      (Asymptomatic varicose veins of bilateral lower extremities [I83.93])    Surgeons: Silvana Beard MD Responsible Provider: Eliana Han MD    Anesthesia Type: general ASA Status: 1            Anesthesia Type: general    Vitals Value Taken Time   /71 10/18/23 1035   Temp 36.4 10/18/23 1035   Pulse 75 10/18/23 1035   Resp 16 10/18/23 1035   SpO2 98 10/18/23 1035       Anesthesia Post Evaluation    Patient location during evaluation: PACU  Level of consciousness: awake  Pain management: adequate  Airway patency: patent  Cardiovascular status: acceptable  Respiratory status: acceptable  Hydration status: acceptable        There were no known notable events for this encounter.

## 2023-10-18 NOTE — OP NOTE
RIGHT GSV RFA, 10 PHLEBECTOMY (R) Operative Note     Date: 10/18/2023  OR Location: [facility] [place] [facility] [place]    Name: Rodolfo Madera, : 1975, Age: 48 y.o., MRN: [id number], Sex: male    Diagnosis  Pre-op Diagnosis     * Venous (peripheral) insufficiency [I87.2]     * Varicose veins of unspecified lower extremity with pain [I83.819]     * Asymptomatic varicose veins of bilateral lower extremities [I83.93] Post-op Diagnosis     * Venous (peripheral) insufficiency [I87.2]     * Varicose veins of unspecified lower extremity with pain [I83.819]     * Asymptomatic varicose veins of bilateral lower extremities [I83.93]     Procedures  RIGHT GSV RFA, 10 PHLEBECTOMY  46827 - [place] ENDOVEN ABLTJ INCMPTNT VEIN XTR RF 1ST VEIN      Surgeons      * Silvana Beard - Primary    Resident/Fellow/Other Assistant:  No surgical staff documented.    Procedure Summary  Anesthesia: General  ASA: I  Anesthesia Staff: Anesthesiologist: Eliana Han MD  CRNA: Montse Garduno, APRN-CRNA  C-AA: ZOLTAN Berry  Estimated Blood Loss: 25mL  Intra-op Medications:   Medication Name Total Dose   lidocaine (Xylocaine) 50 mL in sodium chloride 0.9 % 450 mL OR irrigation 505 mL              Anesthesia Record               Intraprocedure I/O Totals          Intake    .00 mL    Propofol Drip 0.00 mL    The total shown is the total volume documented since Anesthesia Start was filed.    Phenylephrine Drip 0.00 mL    The total shown is the total volume documented since Anesthesia Start was filed.    Total Intake 800 mL          Specimen:   ID Type Source Tests Collected by Time   1 : right leg varicose veins Tissue VEIN SURGICAL PATHOLOGY EXAM Silvana Beard MD 10/18/2023 0944        Staff:   Circulator: Devika Ballard RN  Scrub Person: Sachi Mayberry         Drains and/or Catheters: * None in log *    Tourniquet Times:         Implants:     Findings: see op note    Indications: Rodolfo Madera is an 48 y.o. male who is having surgery for  symptomatic and painful varicose veins of the right lower extremity with chronic venous insufficiency.  He has tried medical grade compression hoses without good relief and presents today for venous intervention given symptomatic disease.    The patient was seen in the preoperative area. The risks, benefits, complications, treatment options, non-operative alternatives, expected recovery and outcomes were discussed with the patient. The possibilities of reaction to medication, pulmonary aspiration, injury to surrounding structures, bleeding, recurrent infection, the need for additional procedures, failure to diagnose a condition, and creating a complication requiring transfusion or operation were discussed with the patient. The patient concurred with the proposed plan, giving informed consent.  The site of surgery was properly noted/marked if necessary per policy. The patient has been actively warmed in preoperative area. Preoperative antibiotics have been ordered and given within 1 hours of incision. Venous thrombosis prophylaxis are not indicated.  SCDs were placed in the left leg during the case.    Procedure Details:   Patient was brought into the operating room and placed in the operating table in supine position with arms out.  After timeout and verification of name, , mrn and allergies, the procedure was initiated.   Verification was additionally obtained by reviewing pre-procedural venous insufficiency studies.     The ____right____ leg was prepped and draped in standard fashion with chlorhexadine.   On table Duplex ultrasound was used to map out the insufficient saphenous vein, rule out any new changes at the groin level and access was determined. The _____GSV___ appeared to join the deep system in the __SFJ ______.       Ultrasound guidance was again used to localize the access site.   This was at the __mid calf _______ level. TUMESCENT ANESTHESIA was injected as a local anesthetic in the subcutaneous  tissues at the target location. Using ultrasound guidance, access was gained at with the 19 gauge thin walled access needle and followed by introduction of a short guidewire, location confirmed with ultrasound. a micropuncture was used first to facilitate access. A small, 2 mm incision was made at the access site to allow for introduction and placement of the 7 Fr x7cm introducer/dilator. The dilator and guidewire were removed. The __7/100_____cm RFA probe device was then inserted up along the ____GSV to the __SFJ____ level under US. CATHETER WAS PLACED ~2.5CM FROM THE SFJ.  We then proceeded with tumescent anesthesia for a total of __500____ cc of volume under US guidance -this was for the whole case including the phlebectomy. TIP OF CATHETER WAS RECHECKED AND CONFIRMED TO NOT HAVE MOVED.  RFA was performed twice cephalad and ___9__ cycles were performed for a total of _3____ MINUTES.    TREATMENT LENGTH: >50CM    Ultrasound confirmed complete coaptation and closure of the treated segments of the ___SV, and the absence of any DVT as well.    We then proceeded to do phlebectomy at the medial knee and upper medial calf and down to the lower calf above the ankle for a total of 10 stab phlebectomy 11 blade was used to make 2 to 3 mm incisions and then using a combination of hook and small hemostats, we were able to remove the veins and bulk and tied any branches that were left behind but otherwise good removal of bulky varicosities was performed.    Following this, the sheath was removed and hemostasis was achieved with manual compression.  We closed the wound with 4-0 Vicryl in buried interrupted fashion and then placed glue on top.     The drapes were removed and the patient cleaned and prepared for discharge.   Post op ultrasound check is scheduled for 48-72 hours and the patient was given written post-op instructions.      NOTABLE FINDINGS:     Complications:  None; patient tolerated the procedure well.     Disposition: PACU - hemodynamically stable.  Condition: stable         Additional Details: N/A    Attending Attestation: I was present and scrubbed for the entire procedure.    Silvana Beard  Phone Number: [phone]

## 2023-10-18 NOTE — BRIEF OP NOTE
"Date: 10/18/2023  [place] [facility] [place] [facility] [place]    Name: Rodolfo Madera, : 1975, Age: 48 y.o., MRN: [id number], Sex: male    Diagnosis  Pre-op Diagnosis     * Venous (peripheral) insufficiency [I87.2]     * Varicose veins of unspecified lower extremity with pain [I83.819]     * Asymptomatic varicose veins of bilateral lower extremities [I83.93] Post-op Diagnosis     * Venous (peripheral) insufficiency [I87.2]     * Varicose veins of unspecified lower extremity with pain [I83.819]     * Asymptomatic varicose veins of bilateral lower extremities [I83.93]     Procedures  RIGHT GSV RFA, 10 PHLEBECTOMY  30642 - [place] ENDOVEN ABLTJ INCMPTNT VEIN XTR RF 1ST VEIN      Surgeons      * Silvana Beard - Primary    Resident/Fellow/Other Assistant:  No surgical staff documented.    Procedure Summary  Anesthesia: General  ASA: I  Anesthesia Staff: Anesthesiologist: Eliana Han MD  CRNA: SYD Lombardi-CRNA  C-AA: ZOLTAN Berry  Estimated Blood Loss: ***mL  Intra-op Medications:   Medication Name Total Dose   lidocaine (Xylocaine) 50 mL in sodium chloride 0.9 % 450 mL OR irrigation 505 mL              Anesthesia Record               Intraprocedure I/O Totals          Intake    .00 mL    Propofol Drip 0.00 mL    The total shown is the total volume documented since Anesthesia Start was filed.    Phenylephrine Drip 0.00 mL    The total shown is the total volume documented since Anesthesia Start was filed.    Total Intake 800 mL          Specimen:   ID Type Source Tests Collected by Time   1 : right leg varicose veins Tissue VEIN SURGICAL PATHOLOGY EXAM Silvana Beard MD 10/18/2023 0923        Staff:   Circulator: Devika Ballard RN  Scrub Person: Sachi Mayberry          Findings: ***    Complications:  {findings; complications tube chest:34622::\"None; patient tolerated the procedure well.\"}     Disposition: {Op note disposition:07903}  Condition: {stable/unstable:35491::\"stable\"}  Specimens Collected:   ID " Type Source Tests Collected by Time   1 : right leg varicose veins Tissue VEIN SURGICAL PATHOLOGY EXAM Silvana Beard MD 10/18/2023 0923     Attending Attestation: {Attestation:[id number]}    Silvana Beard  Phone Number: [phone]

## 2023-10-18 NOTE — ANESTHESIA PROCEDURE NOTES
Airway  Date/Time: 10/18/2023 7:59 AM  Urgency: elective    Airway not difficult    Staffing  Performed: CRNA   Authorized by: Eliana Han MD    Performed by: SYD Lombardi-BUD  Patient location during procedure: OR    Indications and Patient Condition  Indications for airway management: anesthesia and airway protection  Spontaneous ventilation: present  Preoxygenated: yes  Patient position: sniffing  Mask difficulty assessment: 1 - vent by mask    Final Airway Details  Final airway type: supraglottic airway      Successful airway: Supraglottic airway: I gel.  Size 5     Number of attempts at approach: 1    Additional Comments  Atraumatic, positive ETCO2/bbs        
Detail Level: Detailed

## 2023-10-19 ASSESSMENT — PAIN SCALES - GENERAL: PAINLEVEL_OUTOF10: 0 - NO PAIN

## 2023-10-20 ENCOUNTER — HOSPITAL ENCOUNTER (OUTPATIENT)
Dept: VASCULAR MEDICINE | Facility: HOSPITAL | Age: 48
Discharge: HOME | End: 2023-10-20
Payer: COMMERCIAL

## 2023-10-20 DIAGNOSIS — I83.11 VARICOSE VEINS OF RIGHT LOWER EXTREMITY WITH INFLAMMATION: ICD-10-CM

## 2023-10-20 DIAGNOSIS — I87.301 CHRONIC VENOUS HYPERTENSION (IDIOPATHIC) WITHOUT COMPLICATIONS OF RIGHT LOWER EXTREMITY: ICD-10-CM

## 2023-10-20 PROCEDURE — 93971 EXTREMITY STUDY: CPT

## 2023-10-20 PROCEDURE — 93971 EXTREMITY STUDY: CPT | Performed by: SURGERY

## 2023-10-23 LAB
LABORATORY COMMENT REPORT: NORMAL
PATH REPORT.FINAL DX SPEC: NORMAL
PATH REPORT.GROSS SPEC: NORMAL
PATH REPORT.RELEVANT HX SPEC: NORMAL
PATH REPORT.TOTAL CANCER: NORMAL

## 2023-11-03 ENCOUNTER — TELEPHONE (OUTPATIENT)
Dept: VASCULAR MEDICINE | Facility: HOSPITAL | Age: 48
End: 2023-11-03

## 2023-11-03 ENCOUNTER — TELEPHONE (OUTPATIENT)
Dept: VASCULAR SURGERY | Facility: HOSPITAL | Age: 48
End: 2023-11-03
Payer: COMMERCIAL

## 2023-11-03 NOTE — TELEPHONE ENCOUNTER
Phone discussion in regards to patient's symptoms.  Patient having a lot of right ankle discomfort around where the incision sites are located.  Patient was concerned because he had a buy of a special pair shoes but no longer rub on that aspect of his ankle.  He also has a couple small stitches poking through along the phlebectomy areas.  There is no signs or symptoms of infection, no drainage, this all seems to be normal for healing process.  We did discuss not wanting his half mile that he usually runs until things calm down right now.  Walking is encouraged but no heavy having running at this time.  No DVT on his follow-up ultrasound.  This is normal recovery and that your symptoms should improve gradually every day.

## 2023-11-22 ENCOUNTER — OFFICE VISIT (OUTPATIENT)
Dept: VASCULAR SURGERY | Facility: CLINIC | Age: 48
End: 2023-11-22
Payer: COMMERCIAL

## 2023-11-22 VITALS
DIASTOLIC BLOOD PRESSURE: 77 MMHG | SYSTOLIC BLOOD PRESSURE: 128 MMHG | WEIGHT: 205.8 LBS | BODY MASS INDEX: 28.81 KG/M2 | HEIGHT: 71 IN | HEART RATE: 70 BPM

## 2023-11-22 DIAGNOSIS — I83.11 VARICOSE VEINS OF RIGHT LOWER EXTREMITY WITH INFLAMMATION: Primary | ICD-10-CM

## 2023-11-22 PROCEDURE — 99024 POSTOP FOLLOW-UP VISIT: CPT | Performed by: CLINICAL NURSE SPECIALIST

## 2023-11-22 PROCEDURE — 1036F TOBACCO NON-USER: CPT | Performed by: CLINICAL NURSE SPECIALIST

## 2023-11-22 NOTE — PATIENT INSTRUCTIONS
It was a pleasure to see you today.  Status post right GSV RFA with stab phlebectomy.  All incisions healing nicely.  Symptomatic improvement of his right leg.  Continue to wear your medical compression stockings daily up to a year later.  Lets see you back in 9 months to see how you are doing.  Follow-up sooner if needed.  Please call [phone] if you should need anything.   Please call the office with any questions at [phone].   You can speak directly to my Vein Center Nurse Coordinator, Shaye, for specific questions.   You can also email: [email or web address]  If you need coordinating your appointments and testing you can do these at the  or by calling my office shortly after your visit.

## 2023-11-22 NOTE — PROGRESS NOTES
Vascular Surgery Consultation, History, Physical    Mason JINA Madera is a 48 y.o. year old male patient here for follow-up status post right GSV RFA and 10 stab phlebectomy.  Patient doing very well.  Patient states his leg feels much better.  Less achiness less pain and less swelling.  Patient does have 1 area where small stitches come through the phlebectomy site.  Otherwise patient denies any chest pain, shortness of breath, fever or chills.  Patient is compliant with wearing his medical compression stockings.  History:   Procedures 10/18/2023  RIGHT GSV RFA, 10 PHLEBECTOMY  [id number] - [place] ENDOVEN ABLTJ INCMPTNT VEIN XTR RF 1ST VEIN        Surgeons      * Silvana Beard - Primary      Patient is a very pleasant 48-year-old male with past medical history of TMJ here for evaluation of right lower calf varicose vein pain. Patient has been seen by Dr. Beard in the past. With recommendations to continue compression therapy. Patient tells me he lost 40 pounds over the last year intentionally. He has become very active and runs daily. Patient states that now right lower calf pain is throbbing and very achy and seems to worsen when he runs. Patient does have a compression sleeve that he wears which seems to help with his discomfort somewhat. Patient denies any chest pain, shortness of breath, fever or chills. Patient denies any nausea vomiting or abdominal pain. No prior venous intervention. No prior prior DVT. Patient denies any left leg symptoms. He does mention family history of varicose veins.  Past Medical History:   Diagnosis Date    Other specified disorders of temporomandibular joint 05/19/2017    TMJ derangement    PONV (postoperative nausea and vomiting)        Past Surgical History:   Procedure Laterality Date    HERNIA REPAIR  08/05/2016    Hernia Repair    LYMPH NODE BIOPSY  08/05/2016    Biopsy Lymph Node    OTHER SURGICAL HISTORY  08/05/2016    Surgery Testis Reduction Of Torsion Of Testis Left    OTHER  SURGICAL HISTORY  05/07/2021    Dental surgery    OTHER SURGICAL HISTORY  05/19/2017    Arthrodesis Tarsometatarsal 4th Metatarsal-Cuboid Right    SHOULDER SURGERY  05/07/2021    Shoulder Surgery    VASECTOMY  08/05/2016    Surgery Vas Deferens Vasectomy       Active Ambulatory Problems     Diagnosis Date Noted    Vitamin D deficiency 07/05/2023    Post-COVID chronic fatigue 07/05/2023    Penile lesion 07/05/2023    Lung nodule 07/05/2023    Low HDL (under 40) 07/05/2023    Hyperlipidemia 07/05/2023    GERD (gastroesophageal reflux disease) 07/05/2023    Epidermal cyst 07/05/2023    Elevated blood sugar 07/05/2023    Routine general medical examination at a health care facility 07/05/2023    Need for diphtheria-tetanus-pertussis (Tdap) vaccine 07/05/2023    Folliculitis 07/05/2023    Varicose veins of right lower extremity with inflammation 07/05/2023    Coronary artery disease involving native heart without angina pectoris 07/05/2023    Abnormal WBC count 07/05/2023    Dyspnea on exertion 07/05/2023     Resolved Ambulatory Problems     Diagnosis Date Noted    No Resolved Ambulatory Problems     Past Medical History:   Diagnosis Date    Other specified disorders of temporomandibular joint 05/19/2017    PONV (postoperative nausea and vomiting)        Review of Systems   All other systems reviewed and are negative.      Allergies   Allergen Reactions    Sulfa (Sulfonamide Antibiotics) Other     Burning skin       Vitals:   Visit Vitals  /77   Pulse 70     Physical Exam:   Vascular Physical Exam  Constitutional:       General: He is awake.   Musculoskeletal:      Neck: Neck supple.   Skin:     General: Skin is warm and dry.      Comments: Right lower anteromedial shin phlebectomy sites all intact.  1 small suture was removed that was protruding from his phlebectomy site.  Otherwise all sites are intact without signs and symptoms of infection.   Neurological:      Mental Status: He is alert.     C2 disease  VCSS  "score right leg 4  VCSS score left leg 0                                Labs:  LABS:  CBC with Differential:    Lab Results   Component Value Date    WBC 5.4 06/01/2023    RBC 5.45 06/01/2023    HGB 15.4 06/01/2023    HCT 46.8 06/01/2023     06/01/2023    MCV 86 06/01/2023    MCH 28.4 10/26/2020    MCHC 32.9 06/01/2023    RDW 14.6 (H) 06/01/2023    NRBC 0.0 05/07/2021    LYMPHOPCT 32.1 06/01/2023    LYMPHOPCT 32.40 10/26/2020    MONOPCT 10.2 06/01/2023    MONOPCT 8.20 (H) 10/26/2020    EOSPCT 1.1 06/01/2023    BASOPCT 0.6 06/01/2023    MONOSABS 0.55 06/01/2023    LYMPHSABS 1.73 06/01/2023    EOSABS 0.06 06/01/2023    BASOSABS 0.03 06/01/2023     CMP:    Lab Results   Component Value Date     06/01/2023    K 4.3 06/01/2023     06/01/2023    CO2 24 06/01/2023    BUN 15 06/01/2023    CREATININE 1.01 06/01/2023    GLUCOSE 91 06/01/2023    PROT 6.7 06/01/2023    CALCIUM 9.3 06/01/2023    BILITOT 0.8 06/01/2023    ALKPHOS 34 06/01/2023    AST 15 06/01/2023    ALT 18 06/01/2023     BMP:    Lab Results   Component Value Date     06/01/2023    K 4.3 06/01/2023     06/01/2023    CO2 24 06/01/2023    BUN 15 06/01/2023    CREATININE 1.01 06/01/2023    CALCIUM 9.3 06/01/2023    GLUCOSE 91 06/01/2023     Magnesium:No results found for: \"MG\"  Troponin:  No results found for: \"TROPHS\"  BNP: No results found for: \"BNP\"    Lab Results   Component Value Date    INR 1.1 10/31/2017    PROTIME 12.0 10/31/2017    CHOL 165 06/01/2023    LDLF 116 (H) 06/01/2023    HDL 31.9 (A) 06/01/2023       Imaging: DVT study    Impression: Negative    Plan:   It was a pleasure to see you today.  Status post right GSV RFA with stab phlebectomy.  All incisions healing nicely.  Symptomatic improvement of his right leg.  Continue to wear your medical compression stockings daily up to a year later.  Lets see you back in 9 months to see how you are doing.  Follow-up sooner if needed.  Please call [phone] if you should need " anything.   Please call the office with any questions at [phone].   You can speak directly to my [facility] Nurse Coordinator, Shaye, for specific questions.   You can also email: [email or web address]  If you need coordinating your appointments and testing you can do these at the  or by calling my office shortly after your visit.

## 2023-12-08 ENCOUNTER — OFFICE VISIT (OUTPATIENT)
Dept: PRIMARY CARE | Facility: CLINIC | Age: 48
End: 2023-12-08
Payer: COMMERCIAL

## 2023-12-08 VITALS
WEIGHT: 211 LBS | HEART RATE: 96 BPM | SYSTOLIC BLOOD PRESSURE: 122 MMHG | TEMPERATURE: 97.6 F | DIASTOLIC BLOOD PRESSURE: 86 MMHG | OXYGEN SATURATION: 98 % | BODY MASS INDEX: 29.43 KG/M2

## 2023-12-08 DIAGNOSIS — I25.10 CORONARY ARTERY DISEASE INVOLVING NATIVE HEART WITHOUT ANGINA PECTORIS, UNSPECIFIED VESSEL OR LESION TYPE: Primary | ICD-10-CM

## 2023-12-08 DIAGNOSIS — Z82.49 FAMILY HISTORY OF CORONARY ARTERY DISEASE: ICD-10-CM

## 2023-12-08 PROBLEM — I83.819 VARICOSE VEINS OF UNSPECIFIED LOWER EXTREMITY WITH PAIN: Status: ACTIVE | Noted: 2023-12-08

## 2023-12-08 PROBLEM — D22.60 MELANOCYTIC NEVI OF UNSPECIFIED UPPER LIMB, INCLUDING SHOULDER: Status: ACTIVE | Noted: 2019-01-16

## 2023-12-08 PROBLEM — I83.93 VARICOSE VEINS OF LEGS: Status: ACTIVE | Noted: 2023-12-08

## 2023-12-08 PROBLEM — D18.01 HEMANGIOMA OF SKIN AND SUBCUTANEOUS TISSUE: Status: ACTIVE | Noted: 2019-01-16

## 2023-12-08 PROBLEM — D23.61 OTHER BENIGN NEOPLASM OF SKIN OF RIGHT UPPER LIMB, INCLUDING SHOULDER: Status: ACTIVE | Noted: 2019-01-16

## 2023-12-08 PROBLEM — I87.2 VENOUS INSUFFICIENCY: Status: ACTIVE | Noted: 2023-12-08

## 2023-12-08 PROBLEM — D22.39 MELANOCYTIC NEVI OF OTHER PARTS OF FACE: Status: ACTIVE | Noted: 2019-01-16

## 2023-12-08 PROBLEM — M25.561 RIGHT KNEE PAIN: Status: ACTIVE | Noted: 2023-12-08

## 2023-12-08 PROBLEM — L57.9 SKIN CHANGES DUE TO CHRONIC EXPOSURE TO NONIONIZING RADIATION, UNSPECIFIED: Status: ACTIVE | Noted: 2019-01-16

## 2023-12-08 PROBLEM — D22.70 MELANOCYTIC NEVI OF UNSPECIFIED LOWER LIMB, INCLUDING HIP: Status: ACTIVE | Noted: 2019-01-16

## 2023-12-08 PROBLEM — D22.5 MELANOCYTIC NEVI OF TRUNK: Status: ACTIVE | Noted: 2019-01-16

## 2023-12-08 PROCEDURE — 1036F TOBACCO NON-USER: CPT | Performed by: NURSE PRACTITIONER

## 2023-12-08 PROCEDURE — 99214 OFFICE O/P EST MOD 30 MIN: CPT | Performed by: NURSE PRACTITIONER

## 2023-12-08 RX ORDER — ROSUVASTATIN CALCIUM 10 MG/1
10 TABLET, COATED ORAL DAILY
Qty: 30 TABLET | Refills: 0 | Status: SHIPPED | OUTPATIENT
Start: 2023-12-08 | End: 2024-01-08 | Stop reason: SDUPTHER

## 2023-12-08 ASSESSMENT — PATIENT HEALTH QUESTIONNAIRE - PHQ9
1. LITTLE INTEREST OR PLEASURE IN DOING THINGS: NOT AT ALL
2. FEELING DOWN, DEPRESSED OR HOPELESS: NOT AT ALL
SUM OF ALL RESPONSES TO PHQ9 QUESTIONS 1 AND 2: 0

## 2023-12-08 NOTE — PROGRESS NOTES
Subjective   Patient ID: Rodolfo Madera is a 48 y.o. male who presents for Follow-up (Med refills).    HPI   Stefano Beard did the work on varicose vein and is better now.  Glad to have it done.  Just getting in to working out.  Ran out of statin.  Has really changed up diet in last 2 years.  Work is better.  Was stressful.  Kids are busy - good.  No chest pain or shortness of breath.    Denies any side effects to medication.    Review of Systems   Constitutional:  Positive for activity change.   All other systems reviewed and are negative.      Objective   /86   Pulse 96   Temp 36.4 °C (97.6 °F)   Wt 95.7 kg (211 lb)   SpO2 98%   BMI 29.43 kg/m²   See Coronary Artery Calcium Score     Physical Exam  Vitals and nursing note reviewed.   Constitutional:       Appearance: Normal appearance.   HENT:      Head: Normocephalic and atraumatic.      Right Ear: Tympanic membrane, ear canal and external ear normal.      Left Ear: Tympanic membrane, ear canal and external ear normal.      Mouth/Throat:      Pharynx: Oropharynx is clear.   Neck:      Thyroid: No thyroid mass, thyromegaly or thyroid tenderness.   Cardiovascular:      Rate and Rhythm: Normal rate and regular rhythm.      Pulses: Normal pulses.      Heart sounds: Normal heart sounds.   Pulmonary:      Effort: Pulmonary effort is normal.      Breath sounds: Normal breath sounds.   Abdominal:      General: Bowel sounds are normal.      Palpations: Abdomen is soft.   Skin:     General: Skin is warm.   Neurological:      Mental Status: He is alert and oriented to person, place, and time.   Psychiatric:         Mood and Affect: Mood normal.         Behavior: Behavior normal.         Assessment/Plan   Problem List Items Addressed This Visit             ICD-10-CM    Coronary artery disease involving native heart without angina pectoris - Primary I25.10    Relevant Medications    rosuvastatin (Crestor) 10 mg tablet    Other Relevant Orders    Referral to  Cardiology    Echocardiogram stress test    Family history of coronary artery disease Z82.49    Relevant Orders    Referral to Cardiology    Echocardiogram stress test

## 2023-12-20 PROBLEM — Z82.49 FAMILY HISTORY OF CORONARY ARTERY DISEASE: Status: ACTIVE | Noted: 2023-12-20

## 2023-12-20 ASSESSMENT — ENCOUNTER SYMPTOMS: ACTIVITY CHANGE: 1

## 2023-12-22 ENCOUNTER — HOSPITAL ENCOUNTER (OUTPATIENT)
Dept: CARDIOLOGY | Facility: HOSPITAL | Age: 48
Discharge: HOME | End: 2023-12-22
Payer: COMMERCIAL

## 2023-12-22 DIAGNOSIS — Z82.49 FAMILY HISTORY OF CORONARY ARTERY DISEASE: ICD-10-CM

## 2023-12-22 DIAGNOSIS — I25.10 CORONARY ARTERY DISEASE INVOLVING NATIVE HEART WITHOUT ANGINA PECTORIS, UNSPECIFIED VESSEL OR LESION TYPE: ICD-10-CM

## 2023-12-22 PROCEDURE — 93018 CV STRESS TEST I&R ONLY: CPT | Performed by: INTERNAL MEDICINE

## 2023-12-22 PROCEDURE — 93325 DOPPLER ECHO COLOR FLOW MAPG: CPT | Performed by: INTERNAL MEDICINE

## 2023-12-22 PROCEDURE — 93017 CV STRESS TEST TRACING ONLY: CPT

## 2023-12-22 PROCEDURE — 93350 STRESS TTE ONLY: CPT | Performed by: INTERNAL MEDICINE

## 2023-12-22 PROCEDURE — 93016 CV STRESS TEST SUPVJ ONLY: CPT | Performed by: INTERNAL MEDICINE

## 2023-12-22 PROCEDURE — 2500000004 HC RX 250 GENERAL PHARMACY W/ HCPCS (ALT 636 FOR OP/ED): Performed by: INTERNAL MEDICINE

## 2023-12-22 RX ADMIN — PERFLUTREN 2 ML OF DILUTION: 6.52 INJECTION, SUSPENSION INTRAVENOUS at 10:10

## 2024-01-02 ENCOUNTER — TELEMEDICINE CLINICAL SUPPORT (OUTPATIENT)
Dept: PRIMARY CARE | Facility: CLINIC | Age: 49
End: 2024-01-02
Payer: COMMERCIAL

## 2024-01-02 DIAGNOSIS — R05.1 ACUTE COUGH: Primary | ICD-10-CM

## 2024-01-02 DIAGNOSIS — R07.89 CHEST WALL DISCOMFORT: ICD-10-CM

## 2024-01-02 RX ORDER — BENZONATATE 100 MG/1
100 CAPSULE ORAL 3 TIMES DAILY PRN
Qty: 42 CAPSULE | Refills: 0 | Status: SHIPPED | OUTPATIENT
Start: 2024-01-02 | End: 2024-02-01

## 2024-01-03 NOTE — PROGRESS NOTES
"Subjective   Patient ID: Mason Madera \"Juliane" is a 48 y.o. male who presents for Cough.    HPI Patient has had a URI for 5 days and has been coughing a lot. Today he was feeling better but coughed really hard, and hurt his left chest wall. He does not think he has broken a rib and has pain with coughing. No pain with inspiration and current has no SOB.     Review of Systems:  Constitutional: No fever or chills  Cardiovascular: no chest pain, no palpitations and no syncope.   Respiratory: + cough, no shortness of breath during exertion and no shortness of breath at rest.   Gastrointestinal: no abdominal pain, no nausea and no vomiting.  Neuro: No Headache, no dizziness    Physical Exam:   Constitutional: Alert and in no acute distress. Well developed, well nourished.   Head and Face: Head and face: Normal.     Eyes: Normal external exam.    Ears, Nose, Mouth, and Throat: External inspection of ears and nose: Normal.  Hearing: Normal.   Neck: No neck mass was observed. Supple.  Pulmonary: No respiratory distress.    Musculoskeletal: Range of motion: Normal.     Skin: Normal skin color and pigmentation, normal skin turgor, and no rash.    Neurologic: Coordination: Normal.    Psychiatric: Judgment and insight: Intact. Mood and affect: Normal.    Lab Results   Component Value Date    WBC 5.4 06/01/2023    HGB 15.4 06/01/2023    HCT 46.8 06/01/2023     06/01/2023    CHOL 165 06/01/2023    TRIG 85 06/01/2023    HDL 31.9 (A) 06/01/2023    ALT 18 06/01/2023    AST 15 06/01/2023     06/01/2023    K 4.3 06/01/2023     06/01/2023    CREATININE 1.01 06/01/2023    BUN 15 06/01/2023    CO2 24 06/01/2023    TSH 1.74 06/01/2023    PSA 0.28 03/05/2021    INR 1.1 10/31/2017    HGBA1C 5.9 (A) 06/01/2023           Assessment/Plan   Diagnoses and all orders for this visit:  Acute cough  -     benzonatate (Tessalon) 100 mg capsule; Take 1 capsule (100 mg) by mouth 3 times a day as needed for cough. Do not crush " or chew.  Chest wall discomfort      1. Cough - please use Tessalon Pearls    2. Chest Wall discomfort, advised patient to use lidocaine patch and if that does not help to try Naproxen. Advised him to go to the ED for any SOB and severe pain. He will call his PCP tomorrow if pain not better to get CXR      This Medical recommendation has been made based on the Telephonic/Video conversation and the history is given by the patient, which I as a Physician and the patient also understand that there are limitations given the lack of an in-person Physical Exam. Patient will call back if symptoms don't improve.    Marnie Bailey MD

## 2024-01-08 DIAGNOSIS — I25.10 CORONARY ARTERY DISEASE INVOLVING NATIVE HEART WITHOUT ANGINA PECTORIS, UNSPECIFIED VESSEL OR LESION TYPE: ICD-10-CM

## 2024-01-08 PROBLEM — M26.69 DERANGEMENT OF TEMPOROMANDIBULAR JOINT: Status: ACTIVE | Noted: 2024-01-08

## 2024-01-08 PROBLEM — I87.309 VENOUS HYPERTENSION OF LOWER EXTREMITY: Status: ACTIVE | Noted: 2024-01-08

## 2024-01-08 PROBLEM — E66.9 OBESITY: Status: ACTIVE | Noted: 2024-01-08

## 2024-01-08 PROBLEM — H11.30 SUBCONJUNCTIVAL HEMORRHAGE: Status: ACTIVE | Noted: 2024-01-08

## 2024-01-08 RX ORDER — ROSUVASTATIN CALCIUM 10 MG/1
10 TABLET, COATED ORAL DAILY
Qty: 90 TABLET | Refills: 3 | Status: SHIPPED | OUTPATIENT
Start: 2024-01-08 | End: 2024-02-08

## 2024-01-09 ENCOUNTER — OFFICE VISIT (OUTPATIENT)
Dept: CARDIOLOGY | Facility: HOSPITAL | Age: 49
End: 2024-01-09
Payer: COMMERCIAL

## 2024-01-09 VITALS
BODY MASS INDEX: 29.26 KG/M2 | OXYGEN SATURATION: 96 % | SYSTOLIC BLOOD PRESSURE: 121 MMHG | HEART RATE: 81 BPM | HEIGHT: 71 IN | WEIGHT: 209 LBS | DIASTOLIC BLOOD PRESSURE: 81 MMHG

## 2024-01-09 DIAGNOSIS — E78.01 FAMILIAL HYPERCHOLESTEROLEMIA: Primary | ICD-10-CM

## 2024-01-09 DIAGNOSIS — I25.10 CORONARY ARTERY DISEASE INVOLVING NATIVE CORONARY ARTERY OF NATIVE HEART WITHOUT ANGINA PECTORIS: ICD-10-CM

## 2024-01-09 PROCEDURE — 93010 ELECTROCARDIOGRAM REPORT: CPT | Performed by: INTERNAL MEDICINE

## 2024-01-09 PROCEDURE — 99214 OFFICE O/P EST MOD 30 MIN: CPT | Performed by: NURSE PRACTITIONER

## 2024-01-09 PROCEDURE — 93005 ELECTROCARDIOGRAM TRACING: CPT | Performed by: NURSE PRACTITIONER

## 2024-01-09 PROCEDURE — 1036F TOBACCO NON-USER: CPT | Performed by: NURSE PRACTITIONER

## 2024-01-09 RX ORDER — ASPIRIN 81 MG/1
81 TABLET ORAL DAILY
Qty: 90 TABLET | Refills: 3
Start: 2024-01-09 | End: 2025-01-08

## 2024-01-09 RX ORDER — NAPROXEN SODIUM 220 MG/1
81 TABLET, FILM COATED ORAL DAILY
Qty: 30 TABLET | Refills: 11
Start: 2024-01-09 | End: 2024-01-09 | Stop reason: ALTCHOICE

## 2024-01-09 NOTE — PROGRESS NOTES
Primary Care Physician: Hollie Aguero MD  Date of Visit: 01/09/2024  1:30 PM EST  Location of visit: [facility]     Chief Complaint:   Chief Complaint   Patient presents with    Follow-up        HPI / Summary:   Mason Madera is a 48 y.o. male presents for followup. Seen in collaboration with Dr. Miller. He was last seen February 2021. He had recent CT calcium score of 193. 7 with subsequent stress echocardiogram test. He was instructed to follow up with cardiology. He has been riding his stationary bike 30 minutes three times a week and lifting weights 2-3 days per week without chest pain or dyspnea. Denies chest pain, dyspnea, orthopnea, pnd, lightheadedness, dizziness, syncope, palpitations, lower extremity edema, or bleeding issues.                Past Medical History:  Past Medical History:   Diagnosis Date    Other specified disorders of temporomandibular joint 05/19/2017    TMJ derangement    PONV (postoperative nausea and vomiting)         Past Surgical History:  Past Surgical History:   Procedure Laterality Date    HERNIA REPAIR  08/05/2016    Hernia Repair    LYMPH NODE BIOPSY  08/05/2016    Biopsy Lymph Node    OTHER SURGICAL HISTORY  08/05/2016    Surgery Testis Reduction Of Torsion Of Testis Left    OTHER SURGICAL HISTORY  05/07/2021    Dental surgery    OTHER SURGICAL HISTORY  05/19/2017    Arthrodesis Tarsometatarsal 4th Metatarsal-Cuboid Right    SHOULDER SURGERY  05/07/2021    Shoulder Surgery    VASECTOMY  08/05/2016    Surgery Vas Deferens Vasectomy          Social History:   reports that he has quit smoking. His smoking use included cigarettes. He has quit using smokeless tobacco.     Family History:  family history includes Breast cancer in his mother; CARDIAC ARRHYTHMIA in his father; Hypertension in his mother; Lymphoma in his paternal grandfather; MYELOFIBROSIS in his maternal grandfather.      Allergies:  Allergies   Allergen Reactions    Pineapple Unknown    Sulfa (Sulfonamide  "Antibiotics) Other     Burning skin       Outpatient Medications:  Current Outpatient Medications   Medication Instructions    ASHWAGANDHA EXTRACT ORAL oral    benzonatate (TESSALON) 100 mg, oral, 3 times daily PRN, Do not crush or chew.    cholecalciferol (Vitamin D-3) 125 MCG (5000 UT) capsule 1 capsule, oral, Daily    fluticasone (Flonase) 50 mcg/actuation nasal spray nasal, Daily    L. acidophilus/Bifid. animalis 32 billion cell capsule 1 capsule, oral, Daily    magnesium 250 mg tablet 1 tablet, oral, Daily    melatonin 5 mg, oral, As needed    multivitamin tablet oral, Daily    rosuvastatin (CRESTOR) 10 mg, oral, Daily    sodium chloride (Ocean) 0.65 % nasal spray 2 sprays, Each Nostril, As needed       Physical Exam:  Vitals:    01/09/24 1327   BP: 121/81   BP Location: Left arm   Patient Position: Sitting   BP Cuff Size: Adult   Pulse: 81   SpO2: 96%   Weight: 94.8 kg (209 lb)   Height: 1.803 m (5' 11\")     Wt Readings from Last 5 Encounters:   01/09/24 94.8 kg (209 lb)   12/08/23 95.7 kg (211 lb)   11/22/23 93.4 kg (205 lb 12.8 oz)   10/18/23 92.8 kg (204 lb 9.4 oz)   07/07/23 92.8 kg (204 lb 9 oz)     Body mass index is 29.15 kg/m².     GENERAL: alert, cooperative, pleasant, in no acute distress  SKIN: warm and dry  NECK: Normal JVD, negative HJR. No bilateral CA bruits  CARDIAC: Regular rate and rhythm with no rubs, murmurs, or gallops  CHEST: Normal respiratory efforts, lungs clear to auscultation bilaterally.  ABDOMEN: soft, nontender, nondistended  EXTREMITIES: no edema, +2 palpable RP and PT pulses bilaterally       Last Labs:  Recent Labs     06/01/23  0844 02/21/22  0811 05/07/21  0947   WBC 5.4 3.7* 6.2   HGB 15.4 14.3 15.6   HCT 46.8 44.4 47.7    210 202   MCV 86 87 89     Recent Labs     06/01/23  0844 02/21/22  0811 05/07/21  1000    140 140   K 4.3 4.6 4.7    107 104   BUN 15 13 16   CREATININE 1.01 0.94 0.91     CMP -  Lab Results   Component Value Date    CALCIUM 9.3 " 06/01/2023    PROT 6.7 06/01/2023    ALBUMIN 4.3 06/01/2023    ALBUMIN 4.6 10/26/2020    AST 15 06/01/2023    ALT 18 06/01/2023    ALKPHOS 34 06/01/2023    BILITOT 0.8 06/01/2023       LIPID PANEL -   Lab Results   Component Value Date    CHOL 165 06/01/2023    HDL 31.9 (A) 06/01/2023    LDLF 116 (H) 06/01/2023    TRIG 85 06/01/2023       Lab Results   Component Value Date    HGBA1C 5.9 (A) 06/01/2023       Last Cardiology Tests:  ECG:  Obtained and reviewed EKG- normal sinus rhythm HR 81    Echo:  Echo Results:  Echocardiogram Exercise Stress Test With Color Doppler And Contrast 12/22/2023    [place] [address]  Tel [phone] and Fax [phone]      Exercise Stress Echo    Patient Name:      RAIMUNDO MURO AIDA   Ordering Provider:     [id number] JUNIOR OLSEN  Study Date:        12/22/2023           Reading Physician:     Yue Callahan MD  MRN/PID:           [id number]             Supervising Physician: Yue Callahan MD  Accession#:        [id number]         Fellow:  Date of Birth/Age: 1975 / 48 years Fellow:  Gender:            M                    Nurse:                 Kristian Stauffer RN  Admit Date:        12/22/2023           Technician:            Reji Tan  CVT  Admission Status:  Outpatient           Sonographer:           Yvrose Jordan RUST  Height:            180.34 cm            Technologist:  Weight:            95.71 kg             Additional Staff:      Fatmata Olivia RN  BSA:               2.16 m2              Encounter#:            [id number]  BMI:               29.43 kg/m2          Patient Location:      [facility] Non  Invasive    Study Type:    ECHOCARDIOGRAM STRESS TEST  Diagnosis/ICD: Atherosclerotic heart disease of native coronary artery without  angina pectoris-I25.10; Family history of ischemic heart disease  and other diseases of the circulatory system-Z82.49  Indication:    Coronary Artery Disease  CPT  Code:    Falls Risk: Low: Patient has a low risk for sustaining a fall; enviromental safety interventions in place.    Study Details: Correct procedure and correct patient verified verbally and with  ID Band checked.      Patient History: . 47yo M presents for evaluation of CAD, HLD, BLOCK, varicose veins, vitamin D deficiency, GERD, fam hx CAD, former smoker.  Allergies: Sulfa, Pineapple.  Smoker:    Former.  Diabetes:  No.      Medications: The patient's prescribed medication is Magnesium, Rosuvastatin, Vitamin D3, Flonase, MVI, Ashwaganda extract, Priobiotic. Pt took Magnesium and Rosuvastatin this AM.    Patient Performance: The patient exercised to stage on a Pascual protocol for 11 minutes and 00 seconds, achieving 11.6 METS. The peak heart rate achieved was 169 bpm, which was 98 % of the age predicted target heart rate of 172 bpm. The resting blood pressure was 114/86 mmHg with a heart rate of 81 bpm. The standing blood pressure was 118/76 mmHg with a heart rate of 69 bpm. The patient developed leg fatigue during the stress exam. The symptoms resolved with rest. The blood pressure response was normal. The test was terminated due to: patient request, MPHR >85% and leg fatigue and musculoskeletal weakness. Patient has met the discharge criteria and is discharged to home.    Baseline ECG: Resting ECG showed normal sinus rhythm with normal tracing.    Stress ECG: Stress ECG showed sinus tachycardia, with PACs.    Stress Stage Data:  +-----------------+---+-----+-------+----+-------------------------------------+                   HR Sys  Gregorio BPMETSComments                                                   BP                                                     +-----------------+---+-----+-------+----+-------------------------------------+  Baseline Resting 81 114  86                                                 +-----------------+---+-----+-------+----+-------------------------------------+  Baseline [id number] 118  76                                                +-----------------+---+-----+-------+----+-------------------------------------+  Baseline                            denies CP or SOB at baseline, SpO2                                         98%                                    +-----------------+---+-----+-------+----+-------------------------------------+  Stage I          [id number]  70         no symptoms                            +-----------------+---+-----+-------+----+-------------------------------------+  Stage II         361653  72         no symptpoms, SpO2 96%                 +-----------------+---+-----+-------+----+-------------------------------------+  Stage III        [id number]  78         c/o leg fatigue, denies SOB or CP      +-----------------+---+-----+-------+----+-------------------------------------+  Stage IV         [id number]  82     11.6c/o leg fatigue, no other symptoms     +-----------------+---+-----+-------+----+-------------------------------------+      Recovery ECG: Recovery ECG showed normal sinus rhythm. The heart rate recovery was normal.    +------------+---+------+-------+-------------------------------+              HR Sys BPDias BPComments                         +------------+---+------+-------+-------------------------------+  Recovery I  [id number]   84     c/o leg fatigue, SpO2 98%        +------------+---+------+-------+-------------------------------+  Recovery II [id number]   84     leg fatigue resolving, SpO2 98%  +------------+---+------+-------+-------------------------------+  Recovery [id number]   72     leg fatigue resolved             +------------+---+------+-------+-------------------------------+      Baseline Echo: Definity used as a contrast agent for endocardial border definition. Total  contrast used for this procedure was 5 mL via IV push. The resting baseline ejection fraction was estimated at 55%. There are no regional wall motion abnormalities at baseline.    Stress Echo: There are no stress induced regional wall motion abnormalities. The ejection fraction is approximately 70% at peak stress.    Summary:  1. The resting ejection fraction was estimated at 55% with a peak exercise ejection fraction estimated at 70%.  2. Negative exercise ECG for inducible ischemia at a high work load.  3. There were no stress-induced wall motion abnormalities. This is a negative stress echo test for ischemia.  4. Adequate level of stress achieved.    Yue Callahan MD  Electronically signed on 12/22/2023 at 12:35:15 PM              ** Final **         Cath:      Stress Test:    Echocardiogram stress test          [facility], [address] [place] [address]               Tel [phone] and Fax [phone]       Exercise Stress Echo    Patient Name:      RAIMUNDO PARRA   Ordering Provider:     [id number] JUNIOR OLSEN  Study Date:        12/22/2023           Reading Physician:     Yue Callahan MD  MRN/PID:           [id number]             Supervising Physician: Yue Callahan MD  Accession#:        [id number]         Fellow:  Date of Birth/Age: 1975 / 48 years Fellow:  Gender:            M                    Nurse:                 Kristian Stauffer RN  Admit Date:        12/22/2023           Technician:            Reji Tan                                                                 CVT  Admission Status:  Outpatient           Sonographer:           Yvrose Jordan [facility]  Height:            180.34 cm             Technologist:  Weight:            95.71 kg             Additional Staff:      Fatmata Olivia RN  BSA:               2.16 m2              Encounter#:            [id number]  BMI:               29.43 kg/m2          Patient Location:      [facility] Non                                                                 Invasive    Study Type:    ECHOCARDIOGRAM STRESS TEST  Diagnosis/ICD: Atherosclerotic heart disease of native coronary artery without                 angina pectoris-I25.10; Family history of ischemic heart disease                 and other diseases of the circulatory system-Z82.49  Indication:    Coronary Artery Disease  CPT Code:    Falls Risk: Low: Patient has a low risk for sustaining a fall; enviromental safety interventions in place.     Study Details: Correct procedure and correct patient verified verbally and with                 ID Band checked.       Patient History: . 49yo M presents for evaluation of CAD, HLD, BLOCK, varicose veins, vitamin D deficiency, GERD, fam hx CAD, former smoker.  Allergies: Sulfa, Pineapple.  Smoker:    Former.  Diabetes:  No.       Medications: The patient's prescribed medication is Magnesium, Rosuvastatin, Vitamin D3, Flonase, MVI, Ashwaganda extract, Priobiotic. Pt took Magnesium and Rosuvastatin this AM.     Patient Performance: The patient exercised to stage on a Pascual protocol for 11 minutes and 00 seconds, achieving 11.6 METS. The peak heart rate achieved was 169 bpm, which was 98 % of the age predicted target heart rate of 172 bpm. The resting blood pressure was 114/86 mmHg with a heart rate of 81 bpm. The standing blood pressure was 118/76 mmHg with a heart rate of 69 bpm. The patient developed leg fatigue during the stress exam. The symptoms resolved with rest. The blood pressure response was normal. The test was terminated due to: patient request, MPHR >85% and leg fatigue and musculoskeletal weakness. Patient has met the discharge criteria and is discharged to  home.     Baseline ECG: Resting ECG showed normal sinus rhythm with normal tracing.     Stress ECG: Stress ECG showed sinus tachycardia, with PACs.     Stress Stage Data:  +-----------------+---+-----+-------+----+-------------------------------------+                   HR Sys  Gregorio BPMETSComments                                                   BP                                                     +-----------------+---+-----+-------+----+-------------------------------------+  Baseline Resting 81 114  86                                                +-----------------+---+-----+-------+----+-------------------------------------+  Baseline [id number] 118  76                                                +-----------------+---+-----+-------+----+-------------------------------------+  Baseline                            denies CP or SOB at baseline, SpO2                                         98%                                    +-----------------+---+-----+-------+----+-------------------------------------+  Stage I          [id number]  70         no symptoms                            +-----------------+---+-----+-------+----+-------------------------------------+  Stage II         138419  72         no symptpoms, SpO2 96%                 +-----------------+---+-----+-------+----+-------------------------------------+  Stage III        [id number]  78         c/o leg fatigue, denies SOB or CP      +-----------------+---+-----+-------+----+-------------------------------------+  Stage IV         884099  82     11.6c/o leg fatigue, no other symptoms     +-----------------+---+-----+-------+----+-------------------------------------+       Recovery ECG: Recovery ECG showed normal sinus rhythm. The heart rate recovery was normal.     +------------+---+------+-------+-------------------------------+              HR Sys BPDias BPComments                          +------------+---+------+-------+-------------------------------+  Recovery I  [id number]   84     c/o leg fatigue, SpO2 98%        +------------+---+------+-------+-------------------------------+  Recovery II [id number]   84     leg fatigue resolving, SpO2 98%  +------------+---+------+-------+-------------------------------+  Recovery [id number]   72     leg fatigue resolved             +------------+---+------+-------+-------------------------------+       Baseline Echo: Definity used as a contrast agent for endocardial border definition. Total contrast used for this procedure was 5 mL via IV push. The resting baseline ejection fraction was estimated at 55%. There are no regional wall motion abnormalities at baseline.     Stress Echo: There are no stress induced regional wall motion abnormalities. The ejection fraction is approximately 70% at peak stress.     Summary:   1. The resting ejection fraction was estimated at 55% with a peak exercise ejection fraction estimated at 70%.   2. Negative exercise ECG for inducible ischemia at a high work load.   3. There were no stress-induced wall motion abnormalities. This is a negative stress echo test for ischemia.   4. Adequate level of stress achieved.    [id number] Luke Callahan MD  Electronically signed on 12/22/2023 at 12:35:15 PM            ** Final **    CT calcium score 9/1/23  FINDINGS:  The score and distribution of calcium in the coronary arteries is as  follows:     LM 0,  .65,  LCx 1.05,  RCA 0,     Total 193.7    Assessment/Plan   Diagnoses and all orders for this visit:  Familial hypercholesterolemia  -     ECG 12 lead (Clinic Performed)  -     Lipid panel; Future  -     aspirin 81 mg EC tablet; Take 1 tablet (81 mg) by mouth once daily.  Coronary artery disease involving native coronary artery of native heart without angina pectoris  -     Lipid panel; Future  -     aspirin 81 mg EC tablet; Take 1 tablet (81 mg) by mouth once  daily.    In summary Mr. Cabrera is a pleasant 48-year-old white male with a past medical history significant for coronary atherosclerosis with a CT calcium score of 60 in 2017 and 193.7 in 2023 with preserved LV function, dyslipidemia, glucose intolerance, obesity, remote tobacco use, and a family history of coronary disease. He is asymptomatic from a cardiac perspective. His blood pressure is controlled. He had recent CT calcium score of 193.7 with subsequent exercise stress echocardiogram test that was negative for ischemia. He has not had any angina. He had been off his statin when his lipid panel was last checked in June 2023. I have ordered fasting lipid panel. I suspect he will benefit from higher dose statin. Goal for LDL is less than 55. Given his calcium score we did discuss benefits verse risk of Aspirin. I did recommend starting Aspirin 81 mg daily. I have encouraged him to continue physical activity and continue weight loss efforts. He will continue current cardiovascular medications. He will follow up in September with Dr. Miller.     Orders:  No orders of the defined types were placed in this encounter.     Followup Appts:  Future Appointments   Date Time Provider Department Center   8/28/2024 11:00 AM Silvana Beard MD [id number] [place]           ____________________________________________________________  Sara Saravia, APRN-CNP  [facility]  [facility]

## 2024-01-09 NOTE — PATIENT INSTRUCTIONS
Start Aspirin 81 mg once a day  Continue all other cardiovascular medications  Check fasting lipid panel   Follow up in September with Dr. Miller

## 2024-01-11 ENCOUNTER — APPOINTMENT (OUTPATIENT)
Dept: CARDIOLOGY | Facility: HOSPITAL | Age: 49
End: 2024-01-11
Payer: COMMERCIAL

## 2024-02-07 DIAGNOSIS — I25.10 CORONARY ARTERY DISEASE INVOLVING NATIVE HEART WITHOUT ANGINA PECTORIS, UNSPECIFIED VESSEL OR LESION TYPE: ICD-10-CM

## 2024-02-08 RX ORDER — ROSUVASTATIN CALCIUM 10 MG/1
10 TABLET, COATED ORAL DAILY
Qty: 30 TABLET | Refills: 0 | Status: SHIPPED | OUTPATIENT
Start: 2024-02-08 | End: 2024-02-09 | Stop reason: SDUPTHER

## 2024-02-09 DIAGNOSIS — I25.10 CORONARY ARTERY DISEASE INVOLVING NATIVE HEART WITHOUT ANGINA PECTORIS, UNSPECIFIED VESSEL OR LESION TYPE: ICD-10-CM

## 2024-02-09 RX ORDER — ROSUVASTATIN CALCIUM 20 MG/1
20 TABLET, COATED ORAL DAILY
Qty: 90 TABLET | Refills: 3 | Status: SHIPPED | OUTPATIENT
Start: 2024-02-09

## 2024-02-18 LAB
ATRIAL RATE: 81 BPM
P AXIS: 70 DEGREES
P OFFSET: 206 MS
P ONSET: 151 MS
PR INTERVAL: 142 MS
Q ONSET: 222 MS
QRS COUNT: 13 BEATS
QRS DURATION: 92 MS
QT INTERVAL: 360 MS
QTC CALCULATION(BAZETT): 418 MS
QTC FREDERICIA: 397 MS
R AXIS: 69 DEGREES
T AXIS: 67 DEGREES
T OFFSET: 402 MS
VENTRICULAR RATE: 81 BPM

## 2024-07-15 PROBLEM — F90.2 ADHD (ATTENTION DEFICIT HYPERACTIVITY DISORDER), COMBINED TYPE: Status: ACTIVE | Noted: 2024-07-15

## 2024-07-16 ENCOUNTER — OFFICE VISIT (OUTPATIENT)
Dept: PRIMARY CARE | Facility: CLINIC | Age: 49
End: 2024-07-16
Payer: COMMERCIAL

## 2024-07-16 ENCOUNTER — TELEPHONE (OUTPATIENT)
Dept: PRIMARY CARE | Facility: CLINIC | Age: 49
End: 2024-07-16

## 2024-07-16 VITALS
HEART RATE: 71 BPM | BODY MASS INDEX: 28.98 KG/M2 | WEIGHT: 207.8 LBS | DIASTOLIC BLOOD PRESSURE: 81 MMHG | TEMPERATURE: 97.8 F | OXYGEN SATURATION: 96 % | SYSTOLIC BLOOD PRESSURE: 124 MMHG

## 2024-07-16 DIAGNOSIS — Z51.81 ENCOUNTER FOR THERAPEUTIC DRUG MONITORING: ICD-10-CM

## 2024-07-16 DIAGNOSIS — F90.2 ATTENTION DEFICIT HYPERACTIVITY DISORDER (ADHD), COMBINED TYPE: Primary | ICD-10-CM

## 2024-07-16 LAB
AMPHETAMINES UR QL SCN: NORMAL
BARBITURATES UR QL SCN: NORMAL
BENZODIAZ UR QL SCN: NORMAL
BZE UR QL SCN: NORMAL
CANNABINOIDS UR QL SCN: NORMAL
FENTANYL+NORFENTANYL UR QL SCN: NORMAL
METHADONE UR QL SCN: NORMAL
OPIATES UR QL SCN: NORMAL
OXYCODONE+OXYMORPHONE UR QL SCN: NORMAL
PCP UR QL SCN: NORMAL

## 2024-07-16 PROCEDURE — 99214 OFFICE O/P EST MOD 30 MIN: CPT | Performed by: NURSE PRACTITIONER

## 2024-07-16 PROCEDURE — 80307 DRUG TEST PRSMV CHEM ANLYZR: CPT

## 2024-07-16 RX ORDER — LISDEXAMFETAMINE DIMESYLATE 30 MG/1
30 CAPSULE ORAL EVERY MORNING
Qty: 30 CAPSULE | Refills: 0 | Status: SHIPPED | OUTPATIENT
Start: 2024-07-16 | End: 2024-07-22 | Stop reason: WASHOUT

## 2024-07-16 ASSESSMENT — PATIENT HEALTH QUESTIONNAIRE - PHQ9
2. FEELING DOWN, DEPRESSED OR HOPELESS: NOT AT ALL
1. LITTLE INTEREST OR PLEASURE IN DOING THINGS: NOT AT ALL
SUM OF ALL RESPONSES TO PHQ9 QUESTIONS 1 & 2: 0

## 2024-07-16 ASSESSMENT — ENCOUNTER SYMPTOMS
OCCASIONAL FEELINGS OF UNSTEADINESS: 0
LOSS OF SENSATION IN FEET: 0
DEPRESSION: 0

## 2024-07-16 NOTE — TELEPHONE ENCOUNTER
----- Message from Meghna Wick sent at 7/16/2024  6:28 AM EDT -----  Regarding: Apt  Please call Rodolfo.  He had some recent ADHD testing and needs to see me OK acute

## 2024-07-16 NOTE — PROGRESS NOTES
"    Subjective   Patient ID: Rodolfo Madera is a 49 y.o. male who presents for ADHD.    ELISSA Castelan diagnosed at 11yo ADHD (Focalin)  Spouse and son with bad anxiety  Boss has ADHD and interrupts.  Has been wondering for a while if he has this.    He was evaluated by Dr. Mannie Hamilton, PhD  (note reviewed).    \"Testing was brutal\"  Dx was ADHD inattentive type   Sister on Vyvanse and Brother on Focalin  Has never taken any of these.      OARRS:  Meghna Wick, SYD-CNP on 7/16/2024  3:37 PM  I have personally reviewed the OARRS report for Mason Madera. I have considered the risks of abuse, dependence, addiction and diversion and I believe that it is clinically appropriate for Mason Madera to be prescribed this medication    Is the patient prescribed a combination of a benzodiazepine and opioid?  No    Last Urine Drug Screen / ordered today: Yes  Recent Results (from the past 8760 hour(s))   Drug Screen, Urine With Reflex to Confirmation    Collection Time: 07/16/24  3:57 PM   Result Value Ref Range    Amphetamine Screen, Urine Presumptive Negative Presumptive Negative    Barbiturate Screen, Urine Presumptive Negative Presumptive Negative    Benzodiazepines Screen, Urine Presumptive Negative Presumptive Negative    Cannabinoid Screen, Urine Presumptive Negative Presumptive Negative    Cocaine Metabolite Screen, Urine Presumptive Negative Presumptive Negative    Fentanyl Screen, Urine Presumptive Negative Presumptive Negative    Opiate Screen, Urine Presumptive Negative Presumptive Negative    Oxycodone Screen, Urine Presumptive Negative Presumptive Negative    PCP Screen, Urine Presumptive Negative Presumptive Negative    Methadone Screen, Urine Presumptive Negative Presumptive Negative     N/A    UDS done today.  Controlled Substance Agreement:  Date of the Last Agreement: today.  Reviewed Controlled Substance Agreement including but not limited to the benefits, risks, and alternatives to treatment " "with a Controlled Substance medication(s).    Stimulants:   What is the patient's goal of therapy? \"Quiet the noise, stay focused, & not feeling as overwhelmed.  Stay on task and not let things pile up.\"  Is this being achieved with current treatment? New RX     Activities of Daily Living:   Is your overall impression that this patient is benefiting (symptom reduction outweighs side effects) from stimulant therapy? New Rx      Review of Systems   Psychiatric/Behavioral:  Positive for behavioral problems and decreased concentration. The patient is nervous/anxious.    All other systems reviewed and are negative.  BP stable.    Objective   /81 (BP Location: Right arm, Patient Position: Sitting, BP Cuff Size: Adult)   Pulse 71   Temp 36.6 °C (97.8 °F) (Temporal)   Wt 94.3 kg (207 lb 12.8 oz)   SpO2 96%   BMI 28.98 kg/m²     Physical Exam  Vitals and nursing note reviewed.   Constitutional:       Appearance: Normal appearance. He is obese.   HENT:      Head: Normocephalic and atraumatic.   Neurological:      Mental Status: He is alert and oriented to person, place, and time.   Psychiatric:         Thought Content: Thought content normal.         Judgment: Judgment normal.      Comments: Good eye contact, fidgety.           Assessment/Plan   Problem List Items Addressed This Visit             ICD-10-CM    Attention deficit hyperactivity disorder (ADHD), combined type - Primary F90.2    Relevant Medications    lisdexamfetamine (Vyvanse) 30 mg capsule    Other Relevant Orders    Drug Screen, Urine With Reflex to Confirmation (Completed)    Encounter for therapeutic drug monitoring Z51.81    Relevant Orders    Drug Screen, Urine With Reflex to Confirmation (Completed)          "

## 2024-07-22 ENCOUNTER — DOCUMENTATION (OUTPATIENT)
Dept: PRIMARY CARE | Facility: CLINIC | Age: 49
End: 2024-07-22
Payer: COMMERCIAL

## 2024-07-22 DIAGNOSIS — F90.2 ATTENTION DEFICIT HYPERACTIVITY DISORDER (ADHD), COMBINED TYPE: Primary | ICD-10-CM

## 2024-07-22 PROBLEM — Z51.81 ENCOUNTER FOR THERAPEUTIC DRUG MONITORING: Status: ACTIVE | Noted: 2023-07-05

## 2024-07-22 RX ORDER — DEXMETHYLPHENIDATE HYDROCHLORIDE 10 MG/1
10 CAPSULE, EXTENDED RELEASE ORAL DAILY
Qty: 30 CAPSULE | Refills: 0 | Status: SHIPPED | OUTPATIENT
Start: 2024-07-22 | End: 2024-08-21

## 2024-07-22 ASSESSMENT — ENCOUNTER SYMPTOMS
DECREASED CONCENTRATION: 1
NERVOUS/ANXIOUS: 1

## 2024-07-23 NOTE — PROGRESS NOTES
Pt in contact with the pharmacy.  Unable to  - pharmacy needs info  From prescriber.  Second message from pt.  Unable to get Vyvanse due to a National back order on the med.    Pharmacy is asking for another rx med to be prescribed.    Talked with pt.  Son is on Focalin XR    Will try that.

## 2024-07-25 ENCOUNTER — TELEPHONE (OUTPATIENT)
Dept: PRIMARY CARE | Facility: CLINIC | Age: 49
End: 2024-07-25
Payer: COMMERCIAL

## 2024-07-25 NOTE — TELEPHONE ENCOUNTER
----- Message from Elizabeth SCHAFER sent at 7/25/2024  1:48 PM EDT -----  Regarding: FW: Rx    ----- Message -----  From: SYD Escudero-BAILEY  Sent: 7/18/2024  10:56 PM EDT  To: Elizabeth So MA  Subject: Rx                                               I am looking for his PA information for the stimulant.  Please check on the status.

## 2024-07-25 NOTE — TELEPHONE ENCOUNTER
----- Message from Meghna Wick sent at 7/18/2024 10:56 PM EDT -----  Regarding: Rx  I am looking for his PA information for the stimulant.  Please check on the status.

## 2024-08-16 DIAGNOSIS — F90.2 ATTENTION DEFICIT HYPERACTIVITY DISORDER (ADHD), COMBINED TYPE: ICD-10-CM

## 2024-08-16 RX ORDER — DEXMETHYLPHENIDATE HYDROCHLORIDE 10 MG/1
10 CAPSULE, EXTENDED RELEASE ORAL DAILY
Qty: 90 CAPSULE | Refills: 0 | Status: SHIPPED | OUTPATIENT
Start: 2024-08-16 | End: 2024-11-14

## 2024-08-21 ENCOUNTER — LAB (OUTPATIENT)
Dept: LAB | Facility: LAB | Age: 49
End: 2024-08-21
Payer: COMMERCIAL

## 2024-08-21 DIAGNOSIS — I25.10 CORONARY ARTERY DISEASE INVOLVING NATIVE CORONARY ARTERY OF NATIVE HEART WITHOUT ANGINA PECTORIS: ICD-10-CM

## 2024-08-21 DIAGNOSIS — E78.01 FAMILIAL HYPERCHOLESTEROLEMIA: ICD-10-CM

## 2024-08-21 LAB
CHOLEST SERPL-MCNC: 120 MG/DL (ref 0–199)
CHOLESTEROL/HDL RATIO: 4
HDLC SERPL-MCNC: 30 MG/DL
LDLC SERPL CALC-MCNC: 68 MG/DL
NON HDL CHOLESTEROL: 90 MG/DL (ref 0–149)
TRIGL SERPL-MCNC: 112 MG/DL (ref 0–149)
VLDL: 22 MG/DL (ref 0–40)

## 2024-08-21 PROCEDURE — 80061 LIPID PANEL: CPT

## 2024-08-21 PROCEDURE — 36415 COLL VENOUS BLD VENIPUNCTURE: CPT

## 2024-08-23 DIAGNOSIS — F90.2 ATTENTION DEFICIT HYPERACTIVITY DISORDER (ADHD), COMBINED TYPE: ICD-10-CM

## 2024-08-23 RX ORDER — DEXMETHYLPHENIDATE HYDROCHLORIDE 10 MG/1
10 CAPSULE, EXTENDED RELEASE ORAL DAILY
Qty: 30 CAPSULE | Refills: 0 | Status: SHIPPED | OUTPATIENT
Start: 2024-09-22 | End: 2024-10-22

## 2024-08-23 RX ORDER — DEXMETHYLPHENIDATE HYDROCHLORIDE 10 MG/1
10 CAPSULE, EXTENDED RELEASE ORAL DAILY
Qty: 30 CAPSULE | Refills: 0 | Status: SHIPPED | OUTPATIENT
Start: 2024-10-22 | End: 2024-11-21

## 2024-08-23 RX ORDER — DEXMETHYLPHENIDATE HYDROCHLORIDE 10 MG/1
10 CAPSULE, EXTENDED RELEASE ORAL DAILY
Qty: 30 CAPSULE | Refills: 0 | Status: SHIPPED | OUTPATIENT
Start: 2024-08-23 | End: 2024-09-22

## 2024-08-27 ENCOUNTER — OFFICE VISIT (OUTPATIENT)
Dept: CARDIOLOGY | Facility: HOSPITAL | Age: 49
End: 2024-08-27
Payer: COMMERCIAL

## 2024-08-27 VITALS
OXYGEN SATURATION: 97 % | HEIGHT: 71 IN | DIASTOLIC BLOOD PRESSURE: 81 MMHG | SYSTOLIC BLOOD PRESSURE: 121 MMHG | HEART RATE: 64 BPM | WEIGHT: 204.4 LBS | BODY MASS INDEX: 28.61 KG/M2

## 2024-08-27 DIAGNOSIS — E78.00 PURE HYPERCHOLESTEROLEMIA: Primary | ICD-10-CM

## 2024-08-27 DIAGNOSIS — I25.10 CORONARY ARTERY DISEASE INVOLVING NATIVE CORONARY ARTERY OF NATIVE HEART WITHOUT ANGINA PECTORIS: ICD-10-CM

## 2024-08-27 PROCEDURE — 1036F TOBACCO NON-USER: CPT | Performed by: INTERNAL MEDICINE

## 2024-08-27 PROCEDURE — 99214 OFFICE O/P EST MOD 30 MIN: CPT | Performed by: INTERNAL MEDICINE

## 2024-08-27 PROCEDURE — 93005 ELECTROCARDIOGRAM TRACING: CPT | Performed by: INTERNAL MEDICINE

## 2024-08-27 PROCEDURE — 3008F BODY MASS INDEX DOCD: CPT | Performed by: INTERNAL MEDICINE

## 2024-08-27 RX ORDER — ROSUVASTATIN CALCIUM 40 MG/1
40 TABLET, COATED ORAL DAILY
Qty: 90 TABLET | Refills: 3 | Status: SHIPPED | OUTPATIENT
Start: 2024-08-27 | End: 2025-08-27

## 2024-08-27 ASSESSMENT — PATIENT HEALTH QUESTIONNAIRE - PHQ9
SUM OF ALL RESPONSES TO PHQ9 QUESTIONS 1 AND 2: 0
1. LITTLE INTEREST OR PLEASURE IN DOING THINGS: NOT AT ALL
2. FEELING DOWN, DEPRESSED OR HOPELESS: NOT AT ALL

## 2024-08-27 ASSESSMENT — ENCOUNTER SYMPTOMS
DEPRESSION: 0
LOSS OF SENSATION IN FEET: 0
OCCASIONAL FEELINGS OF UNSTEADINESS: 0

## 2024-08-27 NOTE — PATIENT INSTRUCTIONS
Increase rosuvastatin to 40 mg daily.  Check a fasting lipid profile and CMP in 2 months.  Follow-up in 1 year.

## 2024-08-27 NOTE — PROGRESS NOTES
Primary Care Physician: Hollie Aguero MD  Date of Visit: 08/27/2024  4:00 PM EDT  Location of visit: [facility]     Chief Complaint:   No chief complaint on file.       HPI / Summary:   Mason Madera is a 49 y.o. male presents for followup.  He has no cardiac complaints.  He exercises regularly and recently ran a 17 mile race without chest pain or short of breath.  The patient denies chest pain, shortness of breath, palpitations, lightheadedness, syncope, orthopnea, paroxysmal nocturnal dyspnea, lower extremity edema, or bleeding problems.          Past Medical History:   Diagnosis Date    Other specified disorders of temporomandibular joint 05/19/2017    TMJ derangement    PONV (postoperative nausea and vomiting)         Past Surgical History:   Procedure Laterality Date    HERNIA REPAIR  08/05/2016    Hernia Repair    LYMPH NODE BIOPSY  08/05/2016    Biopsy Lymph Node    OTHER SURGICAL HISTORY  08/05/2016    Surgery Testis Reduction Of Torsion Of Testis Left    OTHER SURGICAL HISTORY  05/07/2021    Dental surgery    OTHER SURGICAL HISTORY  05/19/2017    Arthrodesis Tarsometatarsal 4th Metatarsal-Cuboid Right    SHOULDER SURGERY  05/07/2021    Shoulder Surgery    VASECTOMY  08/05/2016    Surgery Vas Deferens Vasectomy          Social History:   reports that he has quit smoking. His smoking use included cigarettes. He has never used smokeless tobacco. He reports current alcohol use. He reports that he does not use drugs.     Family History:  family history includes Breast cancer in his mother; CARDIAC ARRHYTHMIA in his father; Hypertension in his mother; Lymphoma in his paternal grandfather; MYELOFIBROSIS in his maternal grandfather.      Allergies:  Allergies   Allergen Reactions    Pineapple Unknown    Sulfa (Sulfonamide Antibiotics) Other     Burning skin       Outpatient Medications:  Current Outpatient Medications   Medication Instructions    ASHWAGANDHA EXTRACT ORAL oral    aspirin 81 mg, oral,  "Daily    cholecalciferol (Vitamin D-3) 125 MCG (5000 UT) capsule 1 capsule, oral, Daily    [START ON 10/22/2024] dexmethylphenidate XR (FOCALIN XR) 10 mg, oral, Daily, Do not crush, chew, or split.    [START ON 9/22/2024] dexmethylphenidate XR (FOCALIN XR) 10 mg, oral, Daily, Do not crush, chew, or split.    dexmethylphenidate XR (FOCALIN XR) 10 mg, oral, Daily, Do not crush, chew, or split.    fluticasone (Flonase) 50 mcg/actuation nasal spray nasal, Daily    L. acidophilus/Bifid. animalis 32 billion cell capsule 1 capsule, oral, Daily    magnesium 250 mg tablet 1 tablet, oral, Daily    melatonin 5 mg, oral, As needed    multivitamin tablet oral, Daily    rosuvastatin (CRESTOR) 20 mg, oral, Daily    sodium chloride (Ocean) 0.65 % nasal spray 2 sprays, Each Nostril, As needed       Physical Exam:  Vitals:    08/27/24 1559   BP: 121/81   Pulse: 64   SpO2: 97%   Weight: 92.7 kg (204 lb 6.4 oz)   Height: 1.803 m (5' 11\")     Wt Readings from Last 5 Encounters:   08/27/24 92.7 kg (204 lb 6.4 oz)   07/16/24 94.3 kg (207 lb 12.8 oz)   01/09/24 94.8 kg (209 lb)   12/08/23 95.7 kg (211 lb)   11/22/23 93.4 kg (205 lb 12.8 oz)     Body mass index is 28.51 kg/m².   General: Well-developed well-nourished in no acute distress  HEENT: Normocephalic atraumatic  Neck: Supple, JVP is normal negative hepatojugular reflux 2+ carotid pulses without bruit  Pulmonary: Normal respiratory effort, clear to auscultation  Cardiovascular: No right ventricular heave, normal S1 and S2, no murmurs rubs or gallops  Abdomen: Soft nontender nondistended  Extremities: Warm without edema 2+ radial pulses bilaterally 2+ posterior tibial pulses bilaterally  Neurologic: Alert and oriented x3  Psychiatric: Normal mood and affect     Last Labs:  CMP:  Recent Labs     06/01/23  0844 02/21/22  0811 05/07/21  1000 03/05/21  0851 10/26/20  1030    140 140  --  141   K 4.3 4.6 4.7  --  4.2    107 104  --  104   CO2 24 28 28  --  27   ANIONGAP 12 10 " "13  --  10   BUN 15 13 16  --  12   CREATININE 1.01 0.94 0.91   < > 0.9   EGFR  --   --   --   --  97   GLUCOSE 91 93 78  --  97    < > = values in this interval not displayed.     Recent Labs     06/01/23  0844 02/21/22  0811 05/07/21  1000   ALBUMIN 4.3 4.3 4.6   ALKPHOS 34 33 40   ALT 18 19 15   AST 15 14 14   BILITOT 0.8 0.7 1.0     CBC:  Recent Labs     06/01/23  0844 02/21/22  0811 05/07/21  0947   WBC 5.4 3.7* 6.2   HGB 15.4 14.3 15.6   HCT 46.8 44.4 47.7    210 202   MCV 86 87 89     COAG:   Recent Labs     10/31/17  1529   INR 1.1     HEME/ENDO:  Recent Labs     06/01/23  0844 02/21/22  0811 05/07/21  1000 03/05/21  0851   TSH 1.74 1.61 1.28  --    HGBA1C 5.9*  --  5.7 5.9      CARDIAC: No results for input(s): \"LDH\", \"CKMB\", \"TROPHS\", \"BNP\" in the last 75397 hours.    No lab exists for component: \"CK\", \"CKMBP\"  Recent Labs     08/21/24  0709 06/01/23  0844 05/07/21  1000 03/05/21  0851   CHOL 120 165 83 96   LDLF  --  116* 41 55   LDLCALC 68  --   --   --    HDL 30.0 31.9* 32.6* 28.3*   TRIG 112 85 47 62         Last Cardiology Tests:  ECG:  An electrocardiogram performed today that I reviewed shows normal sinus rhythm incomplete right bundle branch block.    Echo:  Echo Results:  Echocardiogram Exercise Stress Test With Color Doppler And Contrast 12/22/2023    [facility], [address] [place] [address]  Tel [phone] and Fax [phone]      Exercise Stress Echo    Patient Name:      RAIMUNDO PARRA   Ordering Provider:     [id number] JUNIOR OLSEN  Study Date:        12/22/2023           Reading Physician:     [id number] Luke Callahan MD  MRN/PID:           [id number]             Supervising Physician: [id number] Luke Callahan MD  Accession#:        [id number]         Fellow:  Date of Birth/Age: 1975 / 48 years Fellow:  Gender:            M                    Nurse:                 Kristian Stauffer RN  Admit Date:        12/22/2023           Technician:       "      Reji Tan  CVT  Admission Status:  Outpatient           Sonographer:           Yvrose Jordan RDCS  Height:            180.34 cm            Technologist:  Weight:            95.71 kg             Additional Staff:      Fatmata Olivia RN  BSA:               2.16 m2              Encounter#:            [id number]  BMI:               29.43 kg/m2          Patient Location:      [facility] Non  In[facility]    Study Type:    ECHOCARDIOGRAM STRESS TEST  Diagnosis/ICD: Atherosclerotic heart disease of native coronary artery without  angina pectoris-I25.10; Family history of ischemic heart disease  and other diseases of the circulatory system-Z82.49  Indication:    Coronary Artery Disease  CPT Code:    Falls Risk: Low: Patient has a low risk for sustaining a fall; enviromental safety interventions in place.    Study Details: Correct procedure and correct patient verified verbally and with  ID Band checked.      Patient History: . 47yo M presents for evaluation of CAD, HLD, BLOCK, varicose veins, vitamin D deficiency, GERD, fam hx CAD, former smoker.  Allergies: Sulfa, Pineapple.  Smoker:    Former.  Diabetes:  No.      Medications: The patient's prescribed medication is Magnesium, Rosuvastatin, Vitamin D3, Flonase, MVI, Ashwaganda extract, Priobiotic. Pt took Magnesium and Rosuvastatin this AM.    Patient Performance: The patient exercised to stage on a Pascual protocol for 11 minutes and 00 seconds, achieving 11.6 METS. The peak heart rate achieved was 169 bpm, which was 98 % of the age predicted target heart rate of 172 bpm. The resting blood pressure was 114/86 mmHg with a heart rate of 81 bpm. The standing blood pressure was 118/76 mmHg with a heart rate of 69 bpm. The patient developed leg fatigue during the stress exam. The symptoms resolved with rest. The blood pressure response was normal. The test was terminated due to: patient request, MPHR >85% and leg fatigue and musculoskeletal weakness. Patient has met the  discharge criteria and is discharged to home.    Baseline ECG: Resting ECG showed normal sinus rhythm with normal tracing.    Stress ECG: Stress ECG showed sinus tachycardia, with PACs.    Stress Stage Data:  +-----------------+---+-----+-------+----+-------------------------------------+                   HR Sys  Gregorio BPMETSComments                                                   BP                                                     +-----------------+---+-----+-------+----+-------------------------------------+  Baseline Resting 81 114  86                                                +-----------------+---+-----+-------+----+-------------------------------------+  Baseline [id number] 118  76                                                +-----------------+---+-----+-------+----+-------------------------------------+  Baseline                            denies CP or SOB at baseline, SpO2                                         98%                                    +-----------------+---+-----+-------+----+-------------------------------------+  Stage I          079673  70         no symptoms                            +-----------------+---+-----+-------+----+-------------------------------------+  Stage II         [id number]  72         no symptpoms, SpO2 96%                 +-----------------+---+-----+-------+----+-------------------------------------+  Stage III        518100  78         c/o leg fatigue, denies SOB or CP      +-----------------+---+-----+-------+----+-------------------------------------+  Stage IV         [id number]  82     11.6c/o leg fatigue, no other symptoms     +-----------------+---+-----+-------+----+-------------------------------------+      Recovery ECG: Recovery ECG showed normal sinus rhythm. The heart rate recovery was normal.    +------------+---+------+-------+-------------------------------+              HR  Sys BPDias BPComments                         +------------+---+------+-------+-------------------------------+  Recovery I  [id number]   84     c/o leg fatigue, SpO2 98%        +------------+---+------+-------+-------------------------------+  Recovery II [id number]   84     leg fatigue resolving, SpO2 98%  +------------+---+------+-------+-------------------------------+  Recovery [id number]   72     leg fatigue resolved             +------------+---+------+-------+-------------------------------+      Baseline Echo: Definity used as a contrast agent for endocardial border definition. Total contrast used for this procedure was 5 mL via IV push. The resting baseline ejection fraction was estimated at 55%. There are no regional wall motion abnormalities at baseline.    Stress Echo: There are no stress induced regional wall motion abnormalities. The ejection fraction is approximately 70% at peak stress.    Summary:  1. The resting ejection fraction was estimated at 55% with a peak exercise ejection fraction estimated at 70%.  2. Negative exercise ECG for inducible ischemia at a high work load.  3. There were no stress-induced wall motion abnormalities. This is a negative stress echo test for ischemia.  4. Adequate level of stress achieved.    [id number] Luke Callahan MD  Electronically signed on 12/22/2023 at 12:35:15 PM              ** Final **       Cath:      Stress Test:  Stress Results:  No results found for this or any previous visit from the past 365 days.         Cardiac Imaging:  CT cardiac scoring September 1, 2023  FINDINGS:   The score and distribution of calcium in the coronary arteries is as   follows:      LM 0,   .65,   LCx 1.05,   RCA 0,      Total 193.7      The visualized ascending thoracic aorta measures 3.0 cm in diameter.   The heart is normal in size. No pericardial effusion is present.       Assessment/Plan   Diagnoses and all orders for this visit:  Pure hypercholesterolemia  -      rosuvastatin (Crestor) 40 mg tablet; Take 1 tablet (40 mg) by mouth once daily.  -     Comprehensive metabolic panel; Future  -     Lipid panel; Future  Coronary artery disease involving native coronary artery of native heart without angina pectoris  -     ECG 12 lead (Clinic Performed)    In summary Mr. Cabrera is a pleasant 49-year-old white male with a past medical history significant for coronary atherosclerosis with a CT calcium score of 60 in 2017 and 193.7 in 2023 with preserved LV function, dyslipidemia, glucose intolerance, obesity, remote tobacco use, and a family history of coronary disease.  He is asymptomatic from a cardiac perspective.  His most recent lipid profile was not quite at goal.  Given his young age and evidence of moderate atherosclerosis I would recommend an LDL goal of less than 55.  I increased his rosuvastatin to 40 mg daily.  I ordered labs as indicated below.  We will see him back in follow-up in 1 year.      Orders:  Orders Placed This Encounter   Procedures    Comprehensive metabolic panel    Lipid panel    ECG 12 lead (Clinic Performed)      Followup Appts:  Future Appointments   Date Time Provider Department Center   8/28/2024 11:00 AM Silvana Beard MD [id number]           ____________________________________________________________  Martin Millre MD  [facility]  [facility]

## 2024-08-28 ENCOUNTER — APPOINTMENT (OUTPATIENT)
Dept: VASCULAR SURGERY | Facility: CLINIC | Age: 49
End: 2024-08-28
Payer: COMMERCIAL

## 2024-08-28 VITALS
SYSTOLIC BLOOD PRESSURE: 120 MMHG | BODY MASS INDEX: 28.38 KG/M2 | WEIGHT: 202.7 LBS | HEART RATE: 68 BPM | HEIGHT: 71 IN | DIASTOLIC BLOOD PRESSURE: 79 MMHG

## 2024-08-28 DIAGNOSIS — I83.11 VARICOSE VEINS OF RIGHT LOWER EXTREMITY WITH INFLAMMATION: Primary | ICD-10-CM

## 2024-08-28 LAB
ATRIAL RATE: 64 BPM
P AXIS: 72 DEGREES
P OFFSET: 203 MS
P ONSET: 144 MS
PR INTERVAL: 154 MS
Q ONSET: 221 MS
QRS COUNT: 10 BEATS
QRS DURATION: 96 MS
QT INTERVAL: 384 MS
QTC CALCULATION(BAZETT): 396 MS
QTC FREDERICIA: 392 MS
R AXIS: 75 DEGREES
T AXIS: 62 DEGREES
T OFFSET: 413 MS
VENTRICULAR RATE: 64 BPM

## 2024-08-28 PROCEDURE — 1036F TOBACCO NON-USER: CPT | Performed by: SURGERY

## 2024-08-28 PROCEDURE — 99213 OFFICE O/P EST LOW 20 MIN: CPT | Performed by: SURGERY

## 2024-08-28 PROCEDURE — 3008F BODY MASS INDEX DOCD: CPT | Performed by: SURGERY

## 2024-08-28 ASSESSMENT — PATIENT HEALTH QUESTIONNAIRE - PHQ9
SUM OF ALL RESPONSES TO PHQ9 QUESTIONS 1 AND 2: 0
2. FEELING DOWN, DEPRESSED OR HOPELESS: NOT AT ALL
1. LITTLE INTEREST OR PLEASURE IN DOING THINGS: NOT AT ALL

## 2024-08-28 ASSESSMENT — ENCOUNTER SYMPTOMS
PSYCHIATRIC NEGATIVE: 1
ENDOCRINE NEGATIVE: 1
MUSCULOSKELETAL NEGATIVE: 1
NEUROLOGICAL NEGATIVE: 1
DEPRESSION: 0
ALLERGIC/IMMUNOLOGIC NEGATIVE: 1
CONSTITUTIONAL NEGATIVE: 1
HEMATOLOGIC/LYMPHATIC NEGATIVE: 1
OCCASIONAL FEELINGS OF UNSTEADINESS: 0
LOSS OF SENSATION IN FEET: 0
RESPIRATORY NEGATIVE: 1
CARDIOVASCULAR NEGATIVE: 1
EYES NEGATIVE: 1
GASTROINTESTINAL NEGATIVE: 1

## 2024-08-28 ASSESSMENT — COLUMBIA-SUICIDE SEVERITY RATING SCALE - C-SSRS
2. HAVE YOU ACTUALLY HAD ANY THOUGHTS OF KILLING YOURSELF?: NO
6. HAVE YOU EVER DONE ANYTHING, STARTED TO DO ANYTHING, OR PREPARED TO DO ANYTHING TO END YOUR LIFE?: NO
1. IN THE PAST MONTH, HAVE YOU WISHED YOU WERE DEAD OR WISHED YOU COULD GO TO SLEEP AND NOT WAKE UP?: NO

## 2024-08-28 NOTE — PROGRESS NOTES
NPV REASON: Follow-up after right GSV RFA and phlebectomy    CURRENT ENCOUNTER:  Mason Madera is 49 y.o. male is here for follow-up after right GSV RFA and phlebectomy (10 stabs) on 10/18/2023 for symptomatic right lower extremity varicose veins and swelling. He is recovering well. No recurrence of symptomatic. Intermittently using compression. Just completed a 17 mile run without issues. Incision sites well healed    PastMedHX:  Past Medical History:   Diagnosis Date    Other specified disorders of temporomandibular joint 05/19/2017    TMJ derangement    PONV (postoperative nausea and vomiting)      Meds:     Current Outpatient Medications:     ASHWAGANDHA EXTRACT ORAL, Take by mouth., Disp: , Rfl:     aspirin 81 mg EC tablet, Take 1 tablet (81 mg) by mouth once daily., Disp: 90 tablet, Rfl: 3    cholecalciferol (Vitamin D-3) 125 MCG (5000 UT) capsule, Take 1 capsule (125 mcg) by mouth once daily., Disp: , Rfl:     [START ON 10/22/2024] dexmethylphenidate XR (Focalin XR) 10 mg 24 hr capsule, Take 1 capsule (10 mg) by mouth once daily. Do not crush, chew, or split. Do not fill before October 22, 2024., Disp: 30 capsule, Rfl: 0    fluticasone (Flonase) 50 mcg/actuation nasal spray, Administer into affected nostril(s) once daily., Disp: , Rfl:     L. acidophilus/Bifid. animalis 32 billion cell capsule, Take 1 capsule by mouth once daily., Disp: , Rfl:     magnesium 250 mg tablet, Take 1 tablet (250 mg) by mouth once daily., Disp: , Rfl:     melatonin 5 mg tablet, Take 1 tablet (5 mg) by mouth if needed., Disp: , Rfl:     multivitamin tablet, Take by mouth once daily., Disp: , Rfl:     rosuvastatin (Crestor) 40 mg tablet, Take 1 tablet (40 mg) by mouth once daily., Disp: 90 tablet, Rfl: 3    sodium chloride (Ocean) 0.65 % nasal spray, Administer 2 sprays into each nostril if needed., Disp: , Rfl:     [START ON 9/22/2024] dexmethylphenidate XR (Focalin XR) 10 mg 24 hr capsule, Take 1 capsule (10 mg) by mouth once  daily. Do not crush, chew, or split. Do not fill before September 22, 2024. (Patient not taking: Reported on 8/28/2024 Do not fill before September 22, 2024.), Disp: 30 capsule, Rfl: 0    dexmethylphenidate XR (Focalin XR) 10 mg 24 hr capsule, Take 1 capsule (10 mg) by mouth once daily. Do not crush, chew, or split. (Patient not taking: Reported on 8/28/2024), Disp: 30 capsule, Rfl: 0    Allergies:   Allergies   Allergen Reactions    Pineapple Swelling and Other    Sulfa (Sulfonamide Antibiotics) Other     Burning skin     ROS:  Review of Systems   Constitutional: Negative.    HENT: Negative.     Eyes: Negative.    Respiratory: Negative.     Cardiovascular: Negative.    Gastrointestinal: Negative.    Endocrine: Negative.    Musculoskeletal: Negative.    Skin: Negative.    Allergic/Immunologic: Negative.    Neurological: Negative.    Hematological: Negative.    Psychiatric/Behavioral: Negative.        Objective:  Vitals:  Vitals:    08/28/24 1100   BP: 120/79   Pulse: 68      Exam:  Constitutional: No acute distress, resting comfortably  Neuro:  AOx3, grossly intact  ENMT: moist mucous membranes  CV: no tachycardia  Pulm: non-labored on room air  GI: soft, non-tender, non-distended  Skin: warm and dry  Musculoskeletal: moving all extremities  Extremities: no lower extremity swelling, right leg stab incisions well healed, mild varcositis present in in left calf, no varicose veins present on right leg          Labs  Lab Results   Component Value Date    WBC 5.4 06/01/2023    WBC 3.7 (L) 02/21/2022    WBC 6.2 05/07/2021    HGB 15.4 06/01/2023    HGB 14.3 02/21/2022    HGB 15.6 05/07/2021    HCT 46.8 06/01/2023    HCT 44.4 02/21/2022    HCT 47.7 05/07/2021    MCV 86 06/01/2023    MCV 87 02/21/2022    MCV 89 05/07/2021     06/01/2023     Lab Results   Component Value Date    CREATININE 1.01 06/01/2023    CREATININE 0.94 02/21/2022    CREATININE 0.91 05/07/2021    BUN 15 06/01/2023    BUN 13 02/21/2022    BUN 16  05/07/2021     06/01/2023     02/21/2022     05/07/2021    K 4.3 06/01/2023    K 4.6 02/21/2022    K 4.7 05/07/2021     06/01/2023     02/21/2022     05/07/2021    CO2 24 06/01/2023    CO2 28 02/21/2022    CO2 28 05/07/2021       Imaging:  None at this visit    Assessment & Plan:  Mason Madera is 49 y.o. male with history of TMJ who presents for follow-up after right GSV RFA and phlebectomy (10 stabs) on 10/18/2023 for symptomatic right lower extremity varicose veins and swelling. He is recovering well. No recurrence of symptomatic. Intermittently using compression.    08/28/24  RIGHT LEG C0 NO visible venous disease  RIGHT LEG 0  RIGHT LEG CEAP: 0  RIGHT LEG VCSS SCORE: 0    LEFT LEG C2 Varicose Veins  LEFT LEG VARICOSE VEINS 2  LEFT LEG CEAP: C2  LEFT LEG VCSS SCORE: 2    Continue wearing compression as needed  Activity as tolerated without any restrictions  Return to clinic if there is reoccurrence of symptoms    Tayla Quezada MD  PGY-5 Vascular Surgery Resident    SEEN WITH ABOVE RESIDENT  AGREE WITH PLAN AND ASSESSMENT  DOING GREAT  WILL SEE GISELA Beard MD, MHS, RPVI  , [facility] [employer]  Director, [facility], [facility]  Co-Director, Vascular Laboratories, [facility]  Division of Vascular Surgery and Endovascular Therapy  [facility]

## 2024-12-16 DIAGNOSIS — F90.2 ATTENTION DEFICIT HYPERACTIVITY DISORDER (ADHD), COMBINED TYPE: Primary | ICD-10-CM

## 2024-12-16 DIAGNOSIS — F45.8 BRUXISM: ICD-10-CM

## 2025-02-03 NOTE — PROGRESS NOTES
"Outpatient Psychiatry Initial Assessment    Subjective   Mason Madera \"Rodoflo\", a 49 y.o. male, presenting to Psychiatry for evaluation.  Patient is referred by Ms. Delano NP with Primary Care for ADHD medication management.    Virtual or Telephone Consent  An interactive audio and video telecommunication system which permits real time communications between the patient (at the originating site) and provider (at the distant site) was utilized to provide this telehealth service.   Verbal consent was requested and obtained from Mason Madera on this date, 02/04/25 for a telehealth visit.      HPI:  Patient is a 49 y.o. male with a history notable for familial hypercholesterolemia, CAD, and a formal diagnosis of ADHD who presents for ADHD medication management. Referred by his PCP Ms. Meghna Wick.     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 Around the same time, his brother & sister were also diagnosed with ADHD. His father and son had both already been diagnosed.    Patient himself was after psychometric evaluation per psychologist Dr. Mannie Hamilton. diagnosed arted on Focalin 10mg late last year. This ultimately led to bruxism, and he would awaken with significant jaw pain. He noticed a very impressive improvement with it, but the pain has been so intense that he . These have propagated the migraines that he had. He stopped taking it around New Year's, and by a week later his jaw was significantly better.    On chart review, the patient has been given the diagnosis of ADHD A snapshot of his letter is below, and a snapshot of the patient's TU from 7/13/24 is provided under \"Other Objective Information.\"            Psychiatric Review Of Systems:  Depressive Symptoms: concentration  Manic Symptoms: " negative  Anxiety Symptoms: Negative  Psychotic Symptoms: negative  Other Symptoms:    Current Medications:    Current Outpatient Medications:     ASHWAGANDHA EXTRACT ORAL, Take by mouth., Disp: , Rfl:     cholecalciferol (Vitamin D-3) 125 MCG (5000 UT) capsule, Take 1 capsule (125 mcg) by mouth once daily., Disp: , Rfl:     dexmethylphenidate XR (Focalin XR) 10 mg 24 hr capsule, Take 1 capsule (10 mg) by mouth once daily. Do not crush, chew, or split. Do not fill before October 22, 2024., Disp: 30 capsule, Rfl: 0    dexmethylphenidate XR (Focalin XR) 10 mg 24 hr capsule, Take 1 capsule (10 mg) by mouth once daily. Do not crush, chew, or split. Do not fill before September 22, 2024. (Patient not taking: Reported on 8/28/2024 Do not fill before September 22, 2024.), Disp: 30 capsule, Rfl: 0    dexmethylphenidate XR (Focalin XR) 10 mg 24 hr capsule, Take 1 capsule (10 mg) by mouth once daily. Do not crush, chew, or split. (Patient not taking: Reported on 8/28/2024), Disp: 30 capsule, Rfl: 0    fluticasone (Flonase) 50 mcg/actuation nasal spray, Administer into affected nostril(s) once daily., Disp: , Rfl:     L. acidophilus/Bifid. animalis 32 billion cell capsule, Take 1 capsule by mouth once daily., Disp: , Rfl:     magnesium 250 mg tablet, Take 1 tablet (250 mg) by mouth once daily., Disp: , Rfl:     melatonin 5 mg tablet, Take 1 tablet (5 mg) by mouth if needed., Disp: , Rfl:     multivitamin tablet, Take by mouth once daily., Disp: , Rfl:     rosuvastatin (Crestor) 40 mg tablet, Take 1 tablet (40 mg) by mouth once daily., Disp: 90 tablet, Rfl: 3    sodium chloride (Ocean) 0.65 % nasal spray, Administer 2 sprays into each nostril if needed., Disp: , Rfl:     Medical History:  Past Medical History:   Diagnosis Date    Other specified disorders of temporomandibular joint 05/19/2017    TMJ derangement    PONV (postoperative nausea and vomiting)        Past Psychiatric History:   Diagnoses: ADHD, PTSD from an event at  age 22  Previous Psychiatrist: never  Therapy: was in therapy for trauma several years ago  Current psychiatric medications: none  Past psychiatric medications: none  Past psychiatric treatments: none  Hospitalizations: none  Suicide attempts: none  Family psychiatric history:     ADHD- son, father, brother, sister     Anxiety- son    Social History:   Currently lives: house in [place]  Education: Bachelor's  History of Learning Problem: never  Work/Finances:   Marital history/children: wife, son, daughter  Current stressors:  Social support:   Legal History:    History:  History of violence:   Access to Weapons:   Guardian/POA/Payee:      Substance Use History:  Tobacco use: quit 17 years ago  Use of alcohol: occasional, social use  Use of caffeine: coffee 4 /day  Legal consequences of substance use: never  Substance use disorder treatment: never    Record Review: moderate     Medical Review Of Systems:  A comprehensive review of systems was negative.    OARRS:  No data recorded  I have personally reviewed the OARRS report for Mason Madera. I have considered the risks of abuse, dependence, addiction and diversion    Is the patient prescribed a combination of a benzodiazepine and opioid?  No    Last Urine Drug Screen / ordered today: No  Recent Results (from the past 8760 hours)   Drug Screen, Urine With Reflex to Confirmation    Collection Time: 07/16/24  3:57 PM   Result Value Ref Range    Amphetamine Screen, Urine Presumptive Negative Presumptive Negative    Barbiturate Screen, Urine Presumptive Negative Presumptive Negative    Benzodiazepines Screen, Urine Presumptive Negative Presumptive Negative    Cannabinoid Screen, Urine Presumptive Negative Presumptive Negative    Cocaine Metabolite Screen, Urine Presumptive Negative Presumptive Negative    Fentanyl Screen, Urine Presumptive Negative Presumptive Negative    Opiate Screen, Urine Presumptive Negative Presumptive Negative    Oxycodone Screen,  "Urine Presumptive Negative Presumptive Negative    PCP Screen, Urine Presumptive Negative Presumptive Negative    Methadone Screen, Urine Presumptive Negative Presumptive Negative     Results are as expected.         Objective   Mental Status Exam  Appearance: sitting up comfortably in a chair in an office. Well-dressed..  Attitude: polite, conversational, and engaged. Patient is calm and cooperative.  Behavior: organized. Appropriate eye contact. No aggressive or agitated behavior.   Activity: no PMA/PMR.  Mood: \"good\"  Affect: full non-labile euthymic affect congruent w/ stated mood.  Speech: spontaneous, clear, coherent, and fluent. Regular rate, volume, latency, and prosody.  Thought Process: linear, logical, and goal-directed. No loose associations.  Thought Content: appropriate. Denies SI or HI. No preoccupations. No grandiose or paranoid delusions.  Thought Perception: denies A/VH. Does not appears to be responding to internal stimuli.  Cognition: alert. Able to answer questions appropriately throughout interview. Adequate fund of knowledge.   Insight: Good- Patient shows understanding of Dx and need for Tx.   Judgment: Good- Patient shows engagement with interviewer and appropriate interpersonal interactions.  Reliability: Good based on consistent history with intact insight.    Vitals:  There were no vitals filed for this visit.    Other Objective Information:      Hemoglobin   Date/Time Value Ref Range Status   06/01/2023 08:44 AM 15.4 13.5 - 17.5 g/dL Final     WBC   Date/Time Value Ref Range Status   06/01/2023 08:44 AM 5.4 4.4 - 11.3 x10E9/L Final     Sodium   Date/Time Value Ref Range Status   06/01/2023 08:44  136 - 145 mmol/L Final     Potassium   Date/Time Value Ref Range Status   06/01/2023 08:44 AM 4.3 3.5 - 5.3 mmol/L Final     Chloride   Date/Time Value Ref Range Status   06/01/2023 08:44  98 - 107 mmol/L Final     Urea Nitrogen   Date/Time Value Ref Range Status   06/01/2023 08:44 " AM 15 6 - 23 mg/dL Final     Creatinine   Date/Time Value Ref Range Status   06/01/2023 08:44 AM 1.01 0.50 - 1.30 mg/dL Final     Calcium   Date/Time Value Ref Range Status   06/01/2023 08:44 AM 9.3 8.6 - 10.3 mg/dL Final     Total Protein   Date/Time Value Ref Range Status   06/01/2023 08:44 AM 6.7 6.4 - 8.2 g/dL Final     Albumin   Date/Time Value Ref Range Status   06/01/2023 08:44 AM 4.3 3.4 - 5.0 g/dL Final   10/26/2020 10:30 AM 4.6 3.5 - 5.0 GM/DL Final     Total Bilirubin   Date/Time Value Ref Range Status   06/01/2023 08:44 AM 0.8 0.0 - 1.2 mg/dL Final     AST   Date/Time Value Ref Range Status   06/01/2023 08:44 AM 15 9 - 39 U/L Final     ALT (SGPT)   Date/Time Value Ref Range Status   06/01/2023 08:44 AM 18 10 - 52 U/L Final     Comment:      Patients treated with Sulfasalazine may generate    falsely decreased results for ALT.     Alkaline Phosphatase   Date/Time Value Ref Range Status   06/01/2023 08:44 AM 34 33 - 120 U/L Final      LDL   Date/Time Value Ref Range Status   06/01/2023 08:44  (H) 0 - 99 mg/dL Final     Comment:     .                           NEAR      BORD      AGE      DESIRABLE  OPTIMAL    HIGH     HIGH     VERY HIGH     0-19 Y     0 - 109     ---    110-129   >/= 130     ----    20-24 Y     0 - 119     ---    120-159   >/= 160     ----      >24 Y     0 -  99   100-129  130-159   160-189     >/=190  .   05/07/2021 10:00 AM 41 0 - 99 mg/dL Final     Comment:     .                           NEAR      BORD      AGE      DESIRABLE  OPTIMAL    HIGH     HIGH     VERY HIGH     0-19 Y     0 - 109     ---    110-129   >/= 130     ----    20-24 Y     0 - 119     ---    120-159   >/= 160     ----      >24 Y     0 -  99   100-129  130-159   160-189     >/=190  .     03/05/2021 08:51 AM 55 0 - 99 mg/dL Final     Comment:     .                           NEAR      BORD      AGE      DESIRABLE  OPTIMAL    HIGH     HIGH     VERY HIGH     0-19 Y     0 - 109     ---    110-129   >/= 130     ----     20-24 Y     0 - 119     ---    120-159   >/= 160     ----      >24 Y     0 -  99   100-129  130-159   160-189     >/=190  .       Triglycerides   Date/Time Value Ref Range Status   08/21/2024 07:09  0 - 149 mg/dL Final     Comment:        Age         Desirable   Borderline High   High     Very High   0 D-90 D    19 - 174         ----         ----        ----  91 D- 9 Y     0 -  74        75 -  99     >/= 100      ----    10-19 Y     0 -  89        90 - 129     >/= 130      ----    20-24 Y     0 - 114       115 - 149     >/= 150      ----         >24 Y     0 - 149       150 - 199    200- 499    >/= 500    Venipuncture immediately after or during the administration of Metamizole may lead to falsely low results. Testing should be performed immediately prior to Metamizole dosing.     Hemoglobin A1C   Date/Time Value Ref Range Status   06/01/2023 08:44 AM 5.9 (A) % Final     Comment:          Diagnosis of Diabetes-Adults   Non-Diabetic: < or = 5.6%   Increased risk for developing diabetes: 5.7-6.4%   Diagnostic of diabetes: > or = 6.5%  .       Monitoring of Diabetes                Age (y)     Therapeutic Goal (%)   Adults:          >18           <7.0   Pediatrics:    13-18           <7.5                   7-12           <8.0                   0- 6            7.5-8.5   American Diabetes Association. Diabetes Care 33(S1), Jan 2010.   05/07/2021 10:00 AM 5.7 % Final     Comment:          Diagnosis of Diabetes-Adults   Non-Diabetic: < or = 5.6%   Increased risk for developing diabetes: 5.7-6.4%   Diagnostic of diabetes: > or = 6.5%  .       Monitoring of Diabetes                Age (y)     Therapeutic Goal (%)   Adults:          >18           <7.0   Pediatrics:    13-18           <7.5                   7-12           <8.0                   0- 6            7.5-8.5   American Diabetes Association. Diabetes Care 33(S1), Jan 2010.     03/05/2021 08:51 AM 5.9 % Final     Comment:          Diagnosis of Diabetes-Adults    Non-Diabetic: < or = 5.6%   Increased risk for developing diabetes: 5.7-6.4%   Diagnostic of diabetes: > or = 6.5%  .       Monitoring of Diabetes                Age (y)     Therapeutic Goal (%)   Adults:          >18           <7.0   Pediatrics:    13-18           <7.5                   7-12           <8.0                   0- 6            7.5-8.5   American Diabetes Association. Diabetes Care 33(S1), Jan 2010.       Estimated Average Glucose   Date/Time Value Ref Range Status   06/01/2023 08:44  MG/DL Final     TSH   Date/Time Value Ref Range Status   06/01/2023 08:44 AM 1.74 0.44 - 3.98 mIU/L Final     Comment:      TSH testing is performed using different testing    methodology at [facility] than at other    [facility] hospitals. Direct result comparisons should    only be made within the same method.   02/21/2022 08:11 AM 1.61 0.44 - 3.98 mIU/L Final     Comment:      TSH testing is performed using different testing    methodology at [facility] than at other    system hospitals. Direct result comparisons should    only be made within the same method.     05/07/2021 10:00 AM 1.28 0.44 - 3.98 mIU/L Final     Comment:      TSH testing is performed using different testing    methodology at [facility] than at other    [facility] hospitals. Direct result comparisons should    only be made within the same method.       Vitamin B-12   Date/Time Value Ref Range Status   02/21/2022 08:11  211 - 911 pg/mL Final     Vitamin D, 25-Hydroxy   Date/Time Value Ref Range Status   06/01/2023 08:44 AM 45 ng/mL Final     Comment:     .  DEFICIENCY:         < 20   NG/ML  INSUFFICIENCY:      20-29  NG/ML  SUFFICIENCY:         NG/ML    THIS ASSAY ACCURATELY QUANTIFIES THE SUM OF  VITAMIN D3, 25-HYDROXY AND VIT D2,25-HYDROXY.       Recent/Relevant Imaging:  No MRI head results found for the past 12 months   No CT head results found for the past 12 months     Other Recent/Relevant  Studies:  No EMG results found for the past 12 months   No EEG results found for the past 12 months   No echocardiogram results found for the past 12 months  Encounter Date: 08/27/24   ECG 12 lead (Clinic Performed)   Result Value    Ventricular Rate 64    Atrial Rate 64    KY Interval 154    QRS Duration 96    QT Interval 384    QTC Calculation(Bazett) 396    P Axis 72    R Axis 75    T Axis 62    QRS Count 10    Q Onset 221    P Onset 144    P Offset 203    T Offset 413    QTC Fredericia 392    Narrative    Normal sinus rhythm  Incomplete right bundle branch block  Borderline ECG  When compared with ECG of 09-JAN-2024 13:29,  Incomplete right bundle branch block is now Present  Confirmed by Martin Miller ([id number]) on 1/3/2025 11:13:27 AM          Risk Assessment:  Risk of harm to self: Low Risk -- Risk factors include: No significant risk factors identified on screening Protective factors include:Denies current suicidal ideation, Denies history of suicide attempts , Future-oriented talk , Willingness to seek help and support , Skills in problem solving, conflict resolution, and nonviolent handling of disputes, History of adhering to treatment recommendations and/or prescribed medication regimen , and Interpersonal relationships and supports, e.g., family, friends, peers, community     Risk of harm to others: Low Risk - Risk factors include: Gender. Protective factors include: Sense of community, availability/access to resources and support , Sense of optimism, hope , Interpersonal competence , Affect regulation , Sense of self-efficacy, internal locus of control , and Positive, pro-social family/peer network     =========  Assessment/Plan   Mason Madera is a 49 y.o. male with a history notable for familial hypercholesterolemia, CAD, and a formal diagnosis of ADHD who presents for ADHD medication management. He has a strong family history of ADHD and has himself carried the diagnosis since summer 2024. Symptoms  responded significantly to a low-dose of dexmethylphenidate, but this was complicated by intolerably painful bruxism. Based on his neuropsychologic testing and history, agree with the diagnosis of ADHD.  Patient has been failed by the methylphenidate stimulant class, but patients with bruxism sometimes respond better to the amphetamine stimulant class. Will thus start one of these today.  If patient notices no adverse effects, his primary care can continue this medication and uptitrate as tolerated. If the bruxism recurs, patient would at that time best be served by the nonstimulant category of medications.    Diagnoses and all orders for this visit:  Attention deficit hyperactivity disorder (ADHD), combined type.  Bruxism  -     lisdexamphetamine (Vyvanse) 30mg Take 1 capsule (30mg) by mouth once daily in the morning. Disp-30 capsule, R-0     Plan/Recommendations:  Medications: start lisdexamfetamine (Vyvanse) 30mg QAM. If this dose is effective for ADHD symptoms and causes no adverse effects, stay on this dose. If this is not effective but has no adverse effects, can uptitrate by 10mg every week up to a maximum dose of 70mg. If the patient suffers adverse effects, his PCP can discontinue Vyvanse and initiate viloxazine (Qelbr??) 200mg QAM for 2 weeks, then increase by 200mg weekly to effectiveness up to a maximum dose of 600mg.  Orders: Vyvanse 30mg QAM - 30-day supply. Refills to be provided by PCP  Follow up: PRN  Call  Psychiatry at [phone] with issues.  For [facility] residents, [facility] is a 24/7 hotline you can call for assistance [[phone]]. Please call [phone] or go to your closest Emergency Room if you feel unsafe. This includes thoughts of hurting yourself or anyone else, or having other troubles such as hearing voices, seeing visions, or having new and scary thoughts about the people around you.    Review with patient: Treatment plan reviewed with the patient.  Medication  risks/benefit reviewed with the patient  Time Spent:    Prep time: 20  Direct patient time: 30  Documentation time: 10  Total time: 60    Gilbert Matute MD  Neurology PGY-4

## 2025-02-04 ENCOUNTER — APPOINTMENT (OUTPATIENT)
Dept: BEHAVIORAL HEALTH | Facility: CLINIC | Age: 50
End: 2025-02-04
Payer: COMMERCIAL

## 2025-02-04 DIAGNOSIS — F45.8 BRUXISM: ICD-10-CM

## 2025-02-04 DIAGNOSIS — F90.2 ATTENTION DEFICIT HYPERACTIVITY DISORDER (ADHD), COMBINED TYPE: ICD-10-CM

## 2025-02-04 PROCEDURE — 90792 PSYCH DIAG EVAL W/MED SRVCS: CPT

## 2025-02-04 RX ORDER — LISDEXAMFETAMINE DIMESYLATE 30 MG/1
30 CAPSULE ORAL EVERY MORNING
Qty: 30 CAPSULE | Refills: 0 | Status: SHIPPED | OUTPATIENT
Start: 2025-02-04 | End: 2025-03-06

## 2025-02-06 ENCOUNTER — TELEPHONE (OUTPATIENT)
Dept: BEHAVIORAL HEALTH | Facility: CLINIC | Age: 50
End: 2025-02-06
Payer: COMMERCIAL

## 2025-02-06 NOTE — PROGRESS NOTES
PROVIDER: karina  MEDICATION/DOSAGE:    lisdexamfetamine (Vyvanse) 30 mg capsule  30 mg, Every morning     QTY/SUPPLY:  PRIOR AUTH COMPLETED VIA:epic  INSURANCE:   Selected coverage:RAIMUNDO PARRA BB CDH-Y [id number] ([employer])Total coverages:1 Demographics on file   RAIMUNDO PARRA BB CDH-Y [id number] ([employer])  Covered: Retail, Mail Order, SpecialtyUnknown: Long-Term Care                    Member ID: [id number] BIN: [id number]  : 1975   Group ID: [id number] PCN: [id number]  Legal sex: M   Group name: L[facility]/[id number]  Address: [address] [place] [address]                Approved  Prior Authorization Portal   Prior authorization approved  Payer: [employer] Search Patient's Payer Case ID: [id number]    [phone]  Note from payer: Your PA request has been approved. Additional information will be provided in the approval communication. (Message 1145)  Approval Details    Authorized from 2025 to 2028  Electronic appeal: Not supported  View History     Notes     Time User Attachment    Attachment received from payer. 2025  2:43 PM  Incoming Prescription Prior Auth Response, Covermymeds Electronic Prior Authorization Attachment - Document      Medication Being Authorized    lisdexamfetamine (Vyvanse) 30 mg capsule  Take 1 capsule (30 mg) by mouth once daily in the morning.  Dispense: 30 capsule Refills: 0   Start: 2025 End: 3/6/2025   Class: Normal Diagnoses: Attention deficit hyperactivity disorder (ADHD), combined type   This order has been released to its destination.  To be filled at: [facility] - [facility] AT Portal to Registered [facility] Sites  Patient, pharmacy and provider notified.

## 2025-03-14 ENCOUNTER — APPOINTMENT (OUTPATIENT)
Dept: PRIMARY CARE | Facility: CLINIC | Age: 50
End: 2025-03-14
Payer: COMMERCIAL

## 2025-03-14 VITALS
DIASTOLIC BLOOD PRESSURE: 80 MMHG | BODY MASS INDEX: 28.84 KG/M2 | SYSTOLIC BLOOD PRESSURE: 122 MMHG | WEIGHT: 206 LBS | TEMPERATURE: 97.3 F | HEIGHT: 71 IN

## 2025-03-14 DIAGNOSIS — F90.2 ATTENTION DEFICIT HYPERACTIVITY DISORDER (ADHD), COMBINED TYPE: ICD-10-CM

## 2025-03-14 DIAGNOSIS — Z13.29 SCREENING FOR THYROID DISORDER: ICD-10-CM

## 2025-03-14 DIAGNOSIS — Z13.6 SCREENING FOR HEART DISEASE: ICD-10-CM

## 2025-03-14 DIAGNOSIS — Z00.00 HEALTHCARE MAINTENANCE: Primary | ICD-10-CM

## 2025-03-14 DIAGNOSIS — Z12.5 SCREENING FOR PROSTATE CANCER: ICD-10-CM

## 2025-03-14 DIAGNOSIS — Z13.1 SCREENING FOR DIABETES MELLITUS: ICD-10-CM

## 2025-03-14 PROCEDURE — 3008F BODY MASS INDEX DOCD: CPT | Performed by: NURSE PRACTITIONER

## 2025-03-14 PROCEDURE — 99396 PREV VISIT EST AGE 40-64: CPT | Performed by: NURSE PRACTITIONER

## 2025-03-14 PROCEDURE — 99213 OFFICE O/P EST LOW 20 MIN: CPT | Performed by: NURSE PRACTITIONER

## 2025-03-14 RX ORDER — LISDEXAMFETAMINE DIMESYLATE 30 MG/1
30 CAPSULE ORAL EVERY MORNING
Qty: 90 CAPSULE | Refills: 0 | Status: SHIPPED | OUTPATIENT
Start: 2025-03-14 | End: 2025-06-12

## 2025-03-14 RX ORDER — AMOXICILLIN AND CLAVULANATE POTASSIUM 875; 125 MG/1; MG/1
1 TABLET, FILM COATED ORAL 2 TIMES DAILY
COMMUNITY
Start: 2025-03-13 | End: 2025-03-20

## 2025-03-14 RX ORDER — ACETAMINOPHEN 500 MG
500-1000 TABLET ORAL EVERY 6 HOURS PRN
COMMUNITY
Start: 2025-03-13 | End: 2025-03-20

## 2025-03-14 RX ORDER — IBUPROFEN 200 MG
200 TABLET ORAL EVERY 6 HOURS PRN
COMMUNITY
Start: 2025-03-13 | End: 2025-03-20

## 2025-03-14 ASSESSMENT — PATIENT HEALTH QUESTIONNAIRE - PHQ9
1. LITTLE INTEREST OR PLEASURE IN DOING THINGS: NOT AT ALL
SUM OF ALL RESPONSES TO PHQ9 QUESTIONS 1 AND 2: 0
2. FEELING DOWN, DEPRESSED OR HOPELESS: NOT AT ALL

## 2025-03-14 NOTE — PATIENT INSTRUCTIONS
Good to see you today.  RX sent to [facility].  Let me know when you have about 2 weeks left and I can refill.  You will be due to update urine screening in July  Fasting labs and I will follow up.

## 2025-03-14 NOTE — PROGRESS NOTES
Subjective   Patient ID: Rodolfo Madera is a 49 y.o. male who presents for Annual Exam (CPX).    HPI   Vyvanse  30mg  was recommended by [facility]  Maurisio Matute and [facility]  Has been doing better on this.  No further bruxism and symptoms are well controlled.      OARRS:   Filled 2/10/25  [facility]  will do 90 day  I have personally reviewed the OARRS report for Mason Madera. I have considered the risks of abuse, dependence, addiction and diversion and I believe that it is clinically appropriate for Mason Madera to be prescribed this medication    Is the patient prescribed a combination of a benzodiazepine and opioid?  No    Last Urine Drug Screen / ordered today: No  Recent Results (from the past 8760 hours)   Drug Screen, Urine With Reflex to Confirmation    Collection Time: 07/16/24  3:57 PM   Result Value Ref Range    Amphetamine Screen, Urine Presumptive Negative Presumptive Negative    Barbiturate Screen, Urine Presumptive Negative Presumptive Negative    Benzodiazepines Screen, Urine Presumptive Negative Presumptive Negative    Cannabinoid Screen, Urine Presumptive Negative Presumptive Negative    Cocaine Metabolite Screen, Urine Presumptive Negative Presumptive Negative    Fentanyl Screen, Urine Presumptive Negative Presumptive Negative    Opiate Screen, Urine Presumptive Negative Presumptive Negative    Oxycodone Screen, Urine Presumptive Negative Presumptive Negative    PCP Screen, Urine Presumptive Negative Presumptive Negative    Methadone Screen, Urine Presumptive Negative Presumptive Negative     Results are as expected.         Controlled Substance Agreement:  Date of the Last Agreement: 7/16/24  Reviewed Controlled Substance Agreement including but not limited to the benefits, risks, and alternatives to treatment with a Controlled Substance medication(s).    Stimulants:   What is the patient's goal of therapy? Focus, and mood balance  Is this being achieved with  "current treatment? Yes    Activities of Daily Living:   Is your overall impression that this patient is benefiting (symptom reduction outweighs side effects) from stimulant therapy? Yes     1. Physical Functioning: Same  2. Family Relationship: Better  4. Mood: BetterBetter  3. Social Relationship:   5. Sleep Patterns: Better  6. Overall Function: Better    CARDIOLOGY - annual    Review of Systems   Constitutional:  Positive for activity change, appetite change and unexpected weight change.   Cardiovascular:  Negative for chest pain, palpitations and leg swelling.   Endocrine: Negative for polydipsia, polyphagia and polyuria.   Psychiatric/Behavioral:  Positive for decreased concentration. The patient is nervous/anxious.        Objective   /80   Temp 36.3 °C (97.3 °F)   Ht 1.791 m (5' 10.5\")   Wt 93.4 kg (206 lb)   BMI 29.14 kg/m²     Physical Exam  Vitals and nursing note reviewed.   Constitutional:       Appearance: Normal appearance.   HENT:      Head: Normocephalic and atraumatic.      Right Ear: Tympanic membrane, ear canal and external ear normal.      Left Ear: Tympanic membrane, ear canal and external ear normal.      Nose: Nose normal.      Mouth/Throat:      Mouth: Mucous membranes are moist.      Pharynx: Oropharynx is clear.   Eyes:      Extraocular Movements: Extraocular movements intact.      Conjunctiva/sclera: Conjunctivae normal.      Pupils: Pupils are equal, round, and reactive to light.   Neck:      Thyroid: No thyroid mass, thyromegaly or thyroid tenderness.   Cardiovascular:      Rate and Rhythm: Normal rate and regular rhythm.      Pulses: Normal pulses.      Heart sounds: Normal heart sounds.   Pulmonary:      Effort: Pulmonary effort is normal.      Breath sounds: Normal breath sounds.   Abdominal:      General: Bowel sounds are normal.      Palpations: Abdomen is soft.      Hernia: No hernia is present.   Genitourinary:     Penis: Normal.       Testes: Normal.   Musculoskeletal:   "       General: Normal range of motion.      Cervical back: Normal range of motion and neck supple.   Skin:     General: Skin is warm.      Capillary Refill: Capillary refill takes 2 to 3 seconds.   Neurological:      General: No focal deficit present.      Mental Status: He is alert and oriented to person, place, and time.   Psychiatric:         Mood and Affect: Mood normal.         Behavior: Behavior normal.         Thought Content: Thought content normal.         Judgment: Judgment normal.         Assessment/Plan   Assessment & Plan  Healthcare maintenance    Orders:    CBC; Future    Comprehensive Metabolic Panel; Future    Screening for heart disease    Orders:    Lipid Panel; Future    Screening for thyroid disorder    Orders:    TSH with reflex to Free T4 if abnormal; Future    Screening for diabetes mellitus    Orders:    Hemoglobin A1C; Future    Screening for prostate cancer    Orders:    Prostate Specific Antigen, Screen; Future    Attention deficit hyperactivity disorder (ADHD), combined type    Orders:    lisdexamfetamine (Vyvanse) 30 mg capsule; Take 1 capsule (30 mg) by mouth once daily in the morning.

## 2025-03-15 LAB
ALBUMIN SERPL-MCNC: 4.8 G/DL (ref 3.6–5.1)
ALP SERPL-CCNC: 43 U/L (ref 36–130)
ALT SERPL-CCNC: 23 U/L (ref 9–46)
ANION GAP SERPL CALCULATED.4IONS-SCNC: 9 MMOL/L (CALC) (ref 7–17)
AST SERPL-CCNC: 15 U/L (ref 10–40)
BILIRUB SERPL-MCNC: 0.6 MG/DL (ref 0.2–1.2)
BUN SERPL-MCNC: 13 MG/DL (ref 7–25)
CALCIUM SERPL-MCNC: 9.6 MG/DL (ref 8.6–10.3)
CHLORIDE SERPL-SCNC: 103 MMOL/L (ref 98–110)
CHOLEST SERPL-MCNC: 100 MG/DL
CHOLEST/HDLC SERPL: 3.4 (CALC)
CO2 SERPL-SCNC: 28 MMOL/L (ref 20–32)
CREAT SERPL-MCNC: 0.92 MG/DL (ref 0.6–1.29)
EGFRCR SERPLBLD CKD-EPI 2021: 103 ML/MIN/1.73M2
ERYTHROCYTE [DISTWIDTH] IN BLOOD BY AUTOMATED COUNT: 13.3 % (ref 11–15)
EST. AVERAGE GLUCOSE BLD GHB EST-MCNC: 123 MG/DL
EST. AVERAGE GLUCOSE BLD GHB EST-SCNC: 6.8 MMOL/L
GLUCOSE SERPL-MCNC: 95 MG/DL (ref 65–99)
HBA1C MFR BLD: 5.9 % OF TOTAL HGB
HCT VFR BLD AUTO: 48.2 % (ref 38.5–50)
HDLC SERPL-MCNC: 29 MG/DL
HGB BLD-MCNC: 15.5 G/DL (ref 13.2–17.1)
LDLC SERPL CALC-MCNC: 54 MG/DL (CALC)
MCH RBC QN AUTO: 28.1 PG (ref 27–33)
MCHC RBC AUTO-ENTMCNC: 32.2 G/DL (ref 32–36)
MCV RBC AUTO: 87.3 FL (ref 80–100)
NONHDLC SERPL-MCNC: 71 MG/DL (CALC)
PLATELET # BLD AUTO: 198 THOUSAND/UL (ref 140–400)
PMV BLD REES-ECKER: 9.4 FL (ref 7.5–12.5)
POTASSIUM SERPL-SCNC: 4.5 MMOL/L (ref 3.5–5.3)
PROT SERPL-MCNC: 7.3 G/DL (ref 6.1–8.1)
PSA SERPL-MCNC: 0.36 NG/ML
RBC # BLD AUTO: 5.52 MILLION/UL (ref 4.2–5.8)
SODIUM SERPL-SCNC: 140 MMOL/L (ref 135–146)
TRIGL SERPL-MCNC: 86 MG/DL
TSH SERPL-ACNC: 1.86 MIU/L (ref 0.4–4.5)
WBC # BLD AUTO: 5 THOUSAND/UL (ref 3.8–10.8)

## 2025-03-31 PROBLEM — Z13.29 SCREENING FOR THYROID DISORDER: Status: ACTIVE | Noted: 2023-07-05

## 2025-03-31 PROBLEM — Z13.1 SCREENING FOR DIABETES MELLITUS: Status: ACTIVE | Noted: 2023-07-05

## 2025-03-31 PROBLEM — Z13.6 SCREENING FOR HEART DISEASE: Status: ACTIVE | Noted: 2023-07-05

## 2025-03-31 PROBLEM — Z12.5 SCREENING FOR PROSTATE CANCER: Status: ACTIVE | Noted: 2023-07-05

## 2025-03-31 ASSESSMENT — ENCOUNTER SYMPTOMS
UNEXPECTED WEIGHT CHANGE: 1
POLYPHAGIA: 0
ACTIVITY CHANGE: 1
DECREASED CONCENTRATION: 1
NERVOUS/ANXIOUS: 1
APPETITE CHANGE: 1
PALPITATIONS: 0
POLYDIPSIA: 0

## 2025-03-31 NOTE — ASSESSMENT & PLAN NOTE
Orders:    lisdexamfetamine (Vyvanse) 30 mg capsule; Take 1 capsule (30 mg) by mouth once daily in the morning.

## 2025-06-04 ENCOUNTER — OFFICE VISIT (OUTPATIENT)
Dept: PRIMARY CARE | Facility: CLINIC | Age: 50
End: 2025-06-04
Payer: COMMERCIAL

## 2025-06-04 VITALS
BODY MASS INDEX: 27.9 KG/M2 | OXYGEN SATURATION: 99 % | HEART RATE: 81 BPM | WEIGHT: 197.2 LBS | DIASTOLIC BLOOD PRESSURE: 82 MMHG | TEMPERATURE: 98.3 F | SYSTOLIC BLOOD PRESSURE: 126 MMHG

## 2025-06-04 DIAGNOSIS — J20.9 ACUTE BRONCHITIS, UNSPECIFIED ORGANISM: Primary | ICD-10-CM

## 2025-06-04 PROCEDURE — 99213 OFFICE O/P EST LOW 20 MIN: CPT | Performed by: NURSE PRACTITIONER

## 2025-06-04 PROCEDURE — 1036F TOBACCO NON-USER: CPT | Performed by: NURSE PRACTITIONER

## 2025-06-04 RX ORDER — ALBUTEROL SULFATE 90 UG/1
2 INHALANT RESPIRATORY (INHALATION) EVERY 6 HOURS PRN
Qty: 8.5 G | Refills: 0 | Status: SHIPPED | OUTPATIENT
Start: 2025-06-04 | End: 2026-06-04

## 2025-06-04 RX ORDER — DOXYCYCLINE HYCLATE 100 MG
TABLET ORAL
COMMUNITY
Start: 2025-05-27 | End: 2025-06-04 | Stop reason: ALTCHOICE

## 2025-06-04 ASSESSMENT — ENCOUNTER SYMPTOMS
SINUS PRESSURE: 1
ABDOMINAL PAIN: 0
COUGH: 1
MYALGIAS: 1
FEVER: 0
VOMITING: 0
NAUSEA: 0
CHILLS: 0
RHINORRHEA: 0
DIARRHEA: 0
SORE THROAT: 1
SINUS PAIN: 0
SHORTNESS OF BREATH: 1
FATIGUE: 1
WHEEZING: 0

## 2025-06-04 NOTE — PROGRESS NOTES
"Ember Madera \"Rodolfo\" is a 50 y.o. male who presents for URI (Started 5/25- went to [facility] clinic 5/27. Current sx: lingering cough, fatigued, post nasal drip and shortness of breath. ).    URI   Associated symptoms include coughing and a sore throat. Pertinent negatives include no abdominal pain, congestion, diarrhea, ear pain, nausea, rhinorrhea, sinus pain, vomiting or wheezing.     He presents the office today for evaluation of ongoing symptoms.  He started with symptoms on 5/25/2025.  He started with a moist nonproductive cough and PND. (+) fatigue (+) body achess  (+) sore throat in the morning.   No runny nose, nasal congestion or drainage.  (+) sinus pressure and pain.   No ear pain.   (+) body aches   No fever or chills.   No abdominal pain, nausea, vomiting, diarrhea or constipation.  (+) fatigue  Slept for 24 hours  Went to [facility] 5/27/25 and treated with Doxycycline due to coarse breath sounds in right upper lobe.  Overall is feeling better than last week.   (+) SOB mainly around coughing fit- cannot take a deep breath.  No wheezing.  Albuterol has been helpful in the past.  Does not like Tessalon.    Using Flonase regularly.  Took Nyquil last night.     Review of Systems   Constitutional:  Positive for fatigue. Negative for chills and fever.   HENT:  Positive for postnasal drip, sinus pressure and sore throat. Negative for congestion, ear pain, rhinorrhea and sinus pain.    Respiratory:  Positive for cough and shortness of breath. Negative for wheezing.    Gastrointestinal:  Negative for abdominal pain, diarrhea, nausea and vomiting.   Musculoskeletal:  Positive for myalgias.     Objective   /82 (BP Location: Left arm, Patient Position: Sitting)   Pulse 81   Temp 36.8 °C (98.3 °F) (Oral)   Wt 89.4 kg (197 lb 3.2 oz)   SpO2 99%   BMI 27.90 kg/m²     Physical Exam  Constitutional:       General: He is not in acute distress.     Appearance: Normal appearance. He is not " toxic-appearing.   HENT:      Right Ear: Hearing, tympanic membrane, ear canal and external ear normal.      Left Ear: Hearing, tympanic membrane, ear canal and external ear normal.      Nose: Nose normal.      Right Sinus: No maxillary sinus tenderness or frontal sinus tenderness.      Left Sinus: No maxillary sinus tenderness or frontal sinus tenderness.      Mouth/Throat:      Mouth: Mucous membranes are moist.      Pharynx: No pharyngeal swelling or posterior oropharyngeal erythema.   Cardiovascular:      Rate and Rhythm: Normal rate and regular rhythm.      Heart sounds: Normal heart sounds, S1 normal and S2 normal. No murmur heard.  Pulmonary:      Effort: Pulmonary effort is normal. No accessory muscle usage or respiratory distress.      Breath sounds: Normal breath sounds and air entry.   Lymphadenopathy:      Cervical: No cervical adenopathy.   Neurological:      Mental Status: He is alert and oriented to person, place, and time.   Psychiatric:         Mood and Affect: Mood normal.         Behavior: Behavior normal.         Thought Content: Thought content normal.         Judgment: Judgment normal.         Assessment/Plan   Problem List Items Addressed This Visit    None  Visit Diagnoses         Acute bronchitis, unspecified organism    -  Primary    Relevant Medications    albuterol (ProAir HFA) 90 mcg/actuation inhaler          Advised to take Delsym as needed to help with the cough.  Albuterol as needed for coughing fits, shortness of breath or wheezing.  He is to let me know if no improvement by early next week or sooner if worse.     It has been a pleasure seeing you today!

## 2025-06-04 NOTE — PATIENT INSTRUCTIONS
You may take Delsym to help with the cough.  Albuterol as needed for coughing fit, shortness of breath or wheezing.  Let me know if no improvement by early next week or sooner if worse.

## 2025-06-09 ENCOUNTER — PATIENT MESSAGE (OUTPATIENT)
Dept: CARDIOLOGY | Facility: HOSPITAL | Age: 50
End: 2025-06-09
Payer: COMMERCIAL

## 2025-06-09 DIAGNOSIS — E78.00 PURE HYPERCHOLESTEROLEMIA: ICD-10-CM

## 2025-06-10 RX ORDER — ROSUVASTATIN CALCIUM 40 MG/1
40 TABLET, COATED ORAL DAILY
Qty: 90 TABLET | Refills: 3 | Status: SHIPPED | OUTPATIENT
Start: 2025-06-10 | End: 2026-06-10

## 2025-08-27 ENCOUNTER — LAB (OUTPATIENT)
Dept: LAB | Facility: HOSPITAL | Age: 50
End: 2025-08-27
Payer: COMMERCIAL

## 2025-08-27 LAB
ALBUMIN SERPL-MCNC: 4.8 G/DL (ref 3.6–5.1)
ALP SERPL-CCNC: 35 U/L (ref 36–130)
ALT SERPL-CCNC: 26 U/L (ref 9–46)
ANION GAP SERPL CALCULATED.4IONS-SCNC: 8 MMOL/L (CALC) (ref 7–17)
AST SERPL-CCNC: 17 U/L (ref 10–40)
BILIRUB SERPL-MCNC: 0.7 MG/DL (ref 0.2–1.2)
BUN SERPL-MCNC: 19 MG/DL (ref 7–25)
CALCIUM SERPL-MCNC: 9.3 MG/DL (ref 8.6–10.3)
CHLORIDE SERPL-SCNC: 104 MMOL/L (ref 98–110)
CHOLEST SERPL-MCNC: 110 MG/DL
CHOLEST/HDLC SERPL: 3.1 (CALC)
CO2 SERPL-SCNC: 25 MMOL/L (ref 20–32)
CREAT SERPL-MCNC: 0.96 MG/DL (ref 0.6–1.29)
EGFRCR SERPLBLD CKD-EPI 2021: 97 ML/MIN/1.73M2
GLUCOSE SERPL-MCNC: 91 MG/DL (ref 65–99)
HDLC SERPL-MCNC: 36 MG/DL
LDLC SERPL CALC-MCNC: 58 MG/DL (CALC)
NONHDLC SERPL-MCNC: 74 MG/DL (CALC)
POTASSIUM SERPL-SCNC: 4.5 MMOL/L (ref 3.5–5.3)
PROT SERPL-MCNC: 7.1 G/DL (ref 6.1–8.1)
SODIUM SERPL-SCNC: 137 MMOL/L (ref 135–146)
TRIGL SERPL-MCNC: 78 MG/DL

## 2025-08-28 ENCOUNTER — OFFICE VISIT (OUTPATIENT)
Dept: CARDIOLOGY | Facility: HOSPITAL | Age: 50
End: 2025-08-28
Payer: COMMERCIAL

## 2025-08-28 VITALS
HEART RATE: 77 BPM | DIASTOLIC BLOOD PRESSURE: 76 MMHG | BODY MASS INDEX: 28.87 KG/M2 | SYSTOLIC BLOOD PRESSURE: 118 MMHG | HEIGHT: 71 IN | WEIGHT: 206.2 LBS | OXYGEN SATURATION: 95 %

## 2025-08-28 DIAGNOSIS — I25.10 CORONARY ARTERY DISEASE INVOLVING NATIVE CORONARY ARTERY OF NATIVE HEART WITHOUT ANGINA PECTORIS: Primary | ICD-10-CM

## 2025-08-28 DIAGNOSIS — E78.5 DYSLIPIDEMIA: ICD-10-CM

## 2025-08-28 LAB
ATRIAL RATE: 77 BPM
P AXIS: 53 DEGREES
P OFFSET: 204 MS
P ONSET: 149 MS
PR INTERVAL: 146 MS
Q ONSET: 222 MS
QRS COUNT: 13 BEATS
QRS DURATION: 90 MS
QT INTERVAL: 358 MS
QTC CALCULATION(BAZETT): 405 MS
QTC FREDERICIA: 389 MS
R AXIS: 50 DEGREES
T AXIS: 49 DEGREES
T OFFSET: 401 MS
VENTRICULAR RATE: 77 BPM

## 2025-08-28 PROCEDURE — 93005 ELECTROCARDIOGRAM TRACING: CPT | Performed by: NURSE PRACTITIONER

## 2025-08-28 PROCEDURE — 99214 OFFICE O/P EST MOD 30 MIN: CPT | Performed by: NURSE PRACTITIONER

## 2025-08-28 PROCEDURE — 99214 OFFICE O/P EST MOD 30 MIN: CPT

## 2025-08-28 PROCEDURE — 3008F BODY MASS INDEX DOCD: CPT | Performed by: NURSE PRACTITIONER

## 2025-08-29 RX ORDER — ASPIRIN 81 MG/1
81 TABLET ORAL DAILY
Start: 2025-08-29 | End: 2026-08-29

## 2025-09-04 ENCOUNTER — TELEPHONE (OUTPATIENT)
Dept: PRIMARY CARE | Facility: CLINIC | Age: 50
End: 2025-09-04
Payer: COMMERCIAL

## 2025-09-05 ENCOUNTER — OFFICE VISIT (OUTPATIENT)
Dept: PRIMARY CARE | Facility: CLINIC | Age: 50
End: 2025-09-05
Payer: COMMERCIAL

## 2025-09-05 VITALS
TEMPERATURE: 97.7 F | SYSTOLIC BLOOD PRESSURE: 124 MMHG | WEIGHT: 205 LBS | DIASTOLIC BLOOD PRESSURE: 78 MMHG | BODY MASS INDEX: 28.59 KG/M2

## 2025-09-05 DIAGNOSIS — Z51.81 ENCOUNTER FOR THERAPEUTIC DRUG MONITORING: ICD-10-CM

## 2025-09-05 DIAGNOSIS — Z12.11 COLON CANCER SCREENING: Primary | ICD-10-CM

## 2025-09-05 DIAGNOSIS — F90.2 ATTENTION DEFICIT HYPERACTIVITY DISORDER (ADHD), COMBINED TYPE: ICD-10-CM

## 2025-09-05 RX ORDER — LISDEXAMFETAMINE DIMESYLATE 30 MG/1
30 CAPSULE ORAL EVERY MORNING
Qty: 90 CAPSULE | Refills: 0 | Status: SHIPPED | OUTPATIENT
Start: 2025-09-05 | End: 2025-12-04

## 2025-09-06 LAB
AMPHETAMINES UR QL: NEGATIVE NG/ML
BARBITURATES UR QL: NEGATIVE NG/ML
BENZODIAZ UR QL: NEGATIVE NG/ML
BZE UR QL: NEGATIVE NG/ML
CREAT UR-MCNC: 18.1 MG/DL
FENTANYL UR QL SCN: NEGATIVE NG/ML
METHADONE UR QL: NEGATIVE NG/ML
OPIATES UR QL: NEGATIVE NG/ML
OXIDANTS UR QL: NEGATIVE MCG/ML
OXYCODONE UR QL: NEGATIVE NG/ML
PCP UR QL: NEGATIVE NG/ML
PH UR: 6.6 [PH] (ref 4.5–9)
QUEST NOTES AND COMMENTS: ABNORMAL
SP GR UR: 1
THC UR QL: NEGATIVE NG/ML

## 2026-03-05 ENCOUNTER — APPOINTMENT (OUTPATIENT)
Dept: PRIMARY CARE | Facility: CLINIC | Age: 51
End: 2026-03-05
Payer: COMMERCIAL

## 2026-03-20 ENCOUNTER — APPOINTMENT (OUTPATIENT)
Dept: PRIMARY CARE | Facility: CLINIC | Age: 51
End: 2026-03-20
Payer: COMMERCIAL

## (undated) DEVICE — CLOSURE, VENOUS, W/CATHETER, 7 FR, 100 CM

## (undated) DEVICE — SHEATH, INTRODUCER MICRO 7FR X 7CM

## (undated) DEVICE — Device